# Patient Record
Sex: MALE | Race: WHITE | URBAN - METROPOLITAN AREA
[De-identification: names, ages, dates, MRNs, and addresses within clinical notes are randomized per-mention and may not be internally consistent; named-entity substitution may affect disease eponyms.]

---

## 2023-06-03 ENCOUNTER — HOSPITAL ENCOUNTER (INPATIENT)
Age: 73
LOS: 6 days | Discharge: HOME | DRG: 287 | End: 2023-06-09
Attending: STUDENT IN AN ORGANIZED HEALTH CARE EDUCATION/TRAINING PROGRAM | Admitting: FAMILY MEDICINE
Payer: MEDICARE

## 2023-06-03 DIAGNOSIS — Z98.890 STATUS POST DILATION OF ESOPHAGEAL NARROWING: ICD-10-CM

## 2023-06-03 DIAGNOSIS — Z87.19 STATUS POST DILATION OF ESOPHAGEAL NARROWING: ICD-10-CM

## 2023-06-03 PROBLEM — I34.0 SEVERE MITRAL REGURGITATION: Status: ACTIVE | Noted: 2023-06-03

## 2023-06-03 PROBLEM — I42.9 CARDIOMYOPATHY (HCC): Status: ACTIVE | Noted: 2023-06-03

## 2023-06-03 PROBLEM — F17.200 SMOKER: Status: ACTIVE | Noted: 2023-06-03

## 2023-06-03 PROBLEM — I07.1 MODERATE TRICUSPID REGURGITATION: Status: ACTIVE | Noted: 2023-06-03

## 2023-06-03 PROBLEM — I50.21 ACUTE SYSTOLIC HEART FAILURE (HCC): Status: ACTIVE | Noted: 2023-06-03

## 2023-06-03 LAB
ANION GAP SERPL CALCULATED.3IONS-SCNC: 13 MMOL/L (ref 9–17)
BUN SERPL-MCNC: 26 MG/DL (ref 8–23)
CALCIUM SERPL-MCNC: 9.1 MG/DL (ref 8.6–10.4)
CHLORIDE SERPL-SCNC: 101 MMOL/L (ref 98–107)
CO2 SERPL-SCNC: 21 MMOL/L (ref 20–31)
CREAT SERPL-MCNC: 0.94 MG/DL (ref 0.7–1.2)
ERYTHROCYTE [DISTWIDTH] IN BLOOD BY AUTOMATED COUNT: 12.9 % (ref 11.8–14.4)
GFR SERPL CREATININE-BSD FRML MDRD: >60 ML/MIN/1.73M2
GLUCOSE SERPL-MCNC: 127 MG/DL (ref 70–99)
HCT VFR BLD AUTO: 50.4 % (ref 40.7–50.3)
HGB BLD-MCNC: 15.6 G/DL (ref 13–17)
MCH RBC QN AUTO: 32.4 PG (ref 25.2–33.5)
MCHC RBC AUTO-ENTMCNC: 31 G/DL (ref 28.4–34.8)
MCV RBC AUTO: 104.6 FL (ref 82.6–102.9)
NRBC AUTOMATED: 0.2 PER 100 WBC
PARTIAL THROMBOPLASTIN TIME: 31.4 SEC (ref 23–36.5)
PLATELET # BLD AUTO: 144 K/UL (ref 138–453)
PMV BLD AUTO: 11.4 FL (ref 8.1–13.5)
POTASSIUM SERPL-SCNC: 4.8 MMOL/L (ref 3.7–5.3)
RBC # BLD AUTO: 4.82 M/UL (ref 4.21–5.77)
SODIUM SERPL-SCNC: 135 MMOL/L (ref 135–144)
T4 FREE SERPL-MCNC: 1.4 NG/DL (ref 0.9–1.7)
TSH SERPL-MCNC: 6.14 UIU/ML (ref 0.3–5)
WBC OTHER # BLD: 8.9 K/UL (ref 3.5–11.3)

## 2023-06-03 PROCEDURE — 85730 THROMBOPLASTIN TIME PARTIAL: CPT

## 2023-06-03 PROCEDURE — 84439 ASSAY OF FREE THYROXINE: CPT

## 2023-06-03 PROCEDURE — 6360000002 HC RX W HCPCS: Performed by: STUDENT IN AN ORGANIZED HEALTH CARE EDUCATION/TRAINING PROGRAM

## 2023-06-03 PROCEDURE — 2580000003 HC RX 258: Performed by: NURSE PRACTITIONER

## 2023-06-03 PROCEDURE — 36415 COLL VENOUS BLD VENIPUNCTURE: CPT

## 2023-06-03 PROCEDURE — 93005 ELECTROCARDIOGRAM TRACING: CPT | Performed by: NURSE PRACTITIONER

## 2023-06-03 PROCEDURE — 99223 1ST HOSP IP/OBS HIGH 75: CPT | Performed by: STUDENT IN AN ORGANIZED HEALTH CARE EDUCATION/TRAINING PROGRAM

## 2023-06-03 PROCEDURE — 85027 COMPLETE CBC AUTOMATED: CPT

## 2023-06-03 PROCEDURE — 6370000000 HC RX 637 (ALT 250 FOR IP): Performed by: STUDENT IN AN ORGANIZED HEALTH CARE EDUCATION/TRAINING PROGRAM

## 2023-06-03 PROCEDURE — 2580000003 HC RX 258: Performed by: STUDENT IN AN ORGANIZED HEALTH CARE EDUCATION/TRAINING PROGRAM

## 2023-06-03 PROCEDURE — 80048 BASIC METABOLIC PNL TOTAL CA: CPT

## 2023-06-03 PROCEDURE — 6370000000 HC RX 637 (ALT 250 FOR IP): Performed by: NURSE PRACTITIONER

## 2023-06-03 PROCEDURE — 84443 ASSAY THYROID STIM HORMONE: CPT

## 2023-06-03 PROCEDURE — 1200000000 HC SEMI PRIVATE

## 2023-06-03 RX ORDER — HEPARIN SODIUM 10000 [USP'U]/100ML
5-30 INJECTION, SOLUTION INTRAVENOUS CONTINUOUS
Status: DISCONTINUED | OUTPATIENT
Start: 2023-06-03 | End: 2023-06-09

## 2023-06-03 RX ORDER — ONDANSETRON 2 MG/ML
4 INJECTION INTRAMUSCULAR; INTRAVENOUS EVERY 6 HOURS PRN
Status: DISCONTINUED | OUTPATIENT
Start: 2023-06-03 | End: 2023-06-09 | Stop reason: HOSPADM

## 2023-06-03 RX ORDER — ASPIRIN 81 MG/1
81 TABLET, CHEWABLE ORAL DAILY
Status: DISCONTINUED | OUTPATIENT
Start: 2023-06-04 | End: 2023-06-09 | Stop reason: HOSPADM

## 2023-06-03 RX ORDER — POTASSIUM CHLORIDE 20 MEQ/1
40 TABLET, EXTENDED RELEASE ORAL PRN
Status: DISCONTINUED | OUTPATIENT
Start: 2023-06-03 | End: 2023-06-09 | Stop reason: HOSPADM

## 2023-06-03 RX ORDER — ACETAMINOPHEN 650 MG/1
650 SUPPOSITORY RECTAL EVERY 6 HOURS PRN
Status: DISCONTINUED | OUTPATIENT
Start: 2023-06-03 | End: 2023-06-09 | Stop reason: HOSPADM

## 2023-06-03 RX ORDER — ONDANSETRON 4 MG/1
4 TABLET, ORALLY DISINTEGRATING ORAL EVERY 8 HOURS PRN
Status: DISCONTINUED | OUTPATIENT
Start: 2023-06-03 | End: 2023-06-09 | Stop reason: HOSPADM

## 2023-06-03 RX ORDER — DEXAMETHASONE SODIUM PHOSPHATE 10 MG/ML
6 INJECTION INTRAMUSCULAR; INTRAVENOUS EVERY 24 HOURS
Status: DISCONTINUED | OUTPATIENT
Start: 2023-06-03 | End: 2023-06-06

## 2023-06-03 RX ORDER — PANTOPRAZOLE SODIUM 40 MG/1
40 TABLET, DELAYED RELEASE ORAL
Status: DISCONTINUED | OUTPATIENT
Start: 2023-06-04 | End: 2023-06-09 | Stop reason: HOSPADM

## 2023-06-03 RX ORDER — HEPARIN SODIUM 1000 [USP'U]/ML
2000 INJECTION, SOLUTION INTRAVENOUS; SUBCUTANEOUS PRN
Status: DISCONTINUED | OUTPATIENT
Start: 2023-06-03 | End: 2023-06-09 | Stop reason: HOSPADM

## 2023-06-03 RX ORDER — SODIUM CHLORIDE 0.9 % (FLUSH) 0.9 %
5-40 SYRINGE (ML) INJECTION EVERY 12 HOURS SCHEDULED
Status: DISCONTINUED | OUTPATIENT
Start: 2023-06-03 | End: 2023-06-09 | Stop reason: HOSPADM

## 2023-06-03 RX ORDER — HEPARIN SODIUM 1000 [USP'U]/ML
4000 INJECTION, SOLUTION INTRAVENOUS; SUBCUTANEOUS PRN
Status: DISCONTINUED | OUTPATIENT
Start: 2023-06-03 | End: 2023-06-09

## 2023-06-03 RX ORDER — POTASSIUM CHLORIDE 7.45 MG/ML
10 INJECTION INTRAVENOUS PRN
Status: DISCONTINUED | OUTPATIENT
Start: 2023-06-03 | End: 2023-06-09 | Stop reason: HOSPADM

## 2023-06-03 RX ORDER — SODIUM CHLORIDE 0.9 % (FLUSH) 0.9 %
10 SYRINGE (ML) INJECTION PRN
Status: DISCONTINUED | OUTPATIENT
Start: 2023-06-03 | End: 2023-06-09 | Stop reason: HOSPADM

## 2023-06-03 RX ORDER — MAGNESIUM SULFATE 1 G/100ML
1000 INJECTION INTRAVENOUS PRN
Status: DISCONTINUED | OUTPATIENT
Start: 2023-06-03 | End: 2023-06-09 | Stop reason: HOSPADM

## 2023-06-03 RX ORDER — ALBUTEROL SULFATE 2.5 MG/3ML
2.5 SOLUTION RESPIRATORY (INHALATION) EVERY 6 HOURS PRN
Status: DISCONTINUED | OUTPATIENT
Start: 2023-06-03 | End: 2023-06-09 | Stop reason: HOSPADM

## 2023-06-03 RX ORDER — SODIUM CHLORIDE 9 MG/ML
INJECTION, SOLUTION INTRAVENOUS PRN
Status: DISCONTINUED | OUTPATIENT
Start: 2023-06-03 | End: 2023-06-09 | Stop reason: HOSPADM

## 2023-06-03 RX ORDER — HEPARIN SODIUM 1000 [USP'U]/ML
4000 INJECTION, SOLUTION INTRAVENOUS; SUBCUTANEOUS ONCE
Status: COMPLETED | OUTPATIENT
Start: 2023-06-03 | End: 2023-06-03

## 2023-06-03 RX ORDER — ACETAMINOPHEN 325 MG/1
650 TABLET ORAL EVERY 6 HOURS PRN
Status: DISCONTINUED | OUTPATIENT
Start: 2023-06-03 | End: 2023-06-09 | Stop reason: HOSPADM

## 2023-06-03 RX ORDER — ATORVASTATIN CALCIUM 80 MG/1
80 TABLET, FILM COATED ORAL NIGHTLY
Status: DISCONTINUED | OUTPATIENT
Start: 2023-06-03 | End: 2023-06-09 | Stop reason: HOSPADM

## 2023-06-03 RX ADMIN — METOPROLOL TARTRATE 12.5 MG: 25 TABLET, FILM COATED ORAL at 20:57

## 2023-06-03 RX ADMIN — SODIUM CHLORIDE, PRESERVATIVE FREE 10 ML: 5 INJECTION INTRAVENOUS at 20:59

## 2023-06-03 RX ADMIN — ATORVASTATIN CALCIUM 80 MG: 80 TABLET, FILM COATED ORAL at 20:57

## 2023-06-03 RX ADMIN — HEPARIN SODIUM 12 UNITS/KG/HR: 10000 INJECTION, SOLUTION INTRAVENOUS at 21:24

## 2023-06-03 RX ADMIN — DEXAMETHASONE SODIUM PHOSPHATE 6 MG: 10 INJECTION INTRAMUSCULAR; INTRAVENOUS at 20:55

## 2023-06-03 RX ADMIN — HEPARIN SODIUM 4000 UNITS: 1000 INJECTION INTRAVENOUS; SUBCUTANEOUS at 21:25

## 2023-06-03 RX ADMIN — AMIODARONE HYDROCHLORIDE 1 MG/MIN: 50 INJECTION, SOLUTION INTRAVENOUS at 21:01

## 2023-06-03 ASSESSMENT — ENCOUNTER SYMPTOMS
ABDOMINAL DISTENTION: 0
WHEEZING: 1
BACK PAIN: 0
CHEST TIGHTNESS: 1
ABDOMINAL PAIN: 0
COLOR CHANGE: 0

## 2023-06-03 NOTE — H&P
[facility]  Office: [address], DO, Naga Mares, DO, Erum Valdez, DO, Haider Christie Blood, DO, Daksha Mccarthy MD, Bertin Jasso MD, Perla Pacheco MD, Cory Moses MD,  Pooja Pichardo MD, Dewey Sharpe MD, Delcia Epley, DO, Salvatore Camp MD,  Mk Mancia MD, Maximiliano Kidd MD, Smiley Chávez, DO, Ursula Del Angel MD, Dottie Enamorado MD, Darya Levy, DO, Christopher Evans MD, Pat Wetzel MD, Carlotta Krause MD, Tereso Owens MD,  Rosette Fitch DO, Neyda Griffin MD,  Francesca Pathak, CNP,  Elaine Donaldson, CNP, Henok Myers, CNP, Yue Canas, CNP,  Angelita Rangel, [facility], Marquez Flores, CNP, Herman Schwartz, CNP, Loreto Dewey, CNP, Bill Velásquez, CNP, Bekah Trevzio, CNP, Bobbi Rob PA-C, Mode Frausto, [facility], Jack Zheng, CNP, An Ervin, [facility]         [place]    HISTORY AND PHYSICAL EXAMINATION            Date:   6/3/2023  Patient name:  Steven Justin  Date of admission:  6/3/2023  6:08 PM  MRN:   [id number]  Account:  [de-identified]  YOB: 1950  PCP:    Unknown Provider Result (Inactive)  Room:   2004/2004-01  Code Status:    Full Code    Chief Complaint:     Shortness of breath    History Obtained From:     patient, spouse, electronic medical record    History of Present Illness:     Steven Justin is a 68 y.o. Non- / non  male who presents with No chief complaint on file. and is admitted to the hospital for the management of Cardiomyopathy [facility]). 59-year-old male past medical history of orthostatic hypotension, hyperlipidemia presents to [facility] facility with shortness of breath found to have new onset A-fib patient was originally started on amiodarone drip underwent echo showing LVEF 15 to 20% with global hypokinesis of the left ventricle and moderate tricuspid regurgitation moderate to severe mitral regurgitation.  Pateint was transferred to [facility]

## 2023-06-04 ENCOUNTER — APPOINTMENT (OUTPATIENT)
Dept: CT IMAGING | Age: 73
DRG: 287 | End: 2023-06-04
Attending: STUDENT IN AN ORGANIZED HEALTH CARE EDUCATION/TRAINING PROGRAM
Payer: MEDICARE

## 2023-06-04 PROBLEM — E03.8 SUBCLINICAL HYPOTHYROIDISM: Status: ACTIVE | Noted: 2023-06-04

## 2023-06-04 PROBLEM — I48.91 NEW ONSET ATRIAL FIBRILLATION (HCC): Status: ACTIVE | Noted: 2023-06-04

## 2023-06-04 LAB
CHOLEST SERPL-MCNC: 144 MG/DL
CHOLESTEROL/HDL RATIO: 3.2
ERYTHROCYTE [DISTWIDTH] IN BLOOD BY AUTOMATED COUNT: 13 % (ref 11.8–14.4)
HCT VFR BLD AUTO: 47.5 % (ref 40.7–50.3)
HDLC SERPL-MCNC: 45 MG/DL
HGB BLD-MCNC: 15.3 G/DL (ref 13–17)
LDLC SERPL CALC-MCNC: 89 MG/DL (ref 0–130)
MAGNESIUM SERPL-MCNC: 2.2 MG/DL (ref 1.6–2.6)
MCH RBC QN AUTO: 32.1 PG (ref 25.2–33.5)
MCHC RBC AUTO-ENTMCNC: 32.2 G/DL (ref 28.4–34.8)
MCV RBC AUTO: 99.6 FL (ref 82.6–102.9)
NRBC AUTOMATED: 0 PER 100 WBC
PARTIAL THROMBOPLASTIN TIME: 57.3 SEC (ref 23–36.5)
PARTIAL THROMBOPLASTIN TIME: 64.5 SEC (ref 23–36.5)
PARTIAL THROMBOPLASTIN TIME: 79.5 SEC (ref 23–36.5)
PLATELET # BLD AUTO: 156 K/UL (ref 138–453)
PMV BLD AUTO: 10.9 FL (ref 8.1–13.5)
RBC # BLD AUTO: 4.77 M/UL (ref 4.21–5.77)
TRIGL SERPL-MCNC: 50 MG/DL
TROPONIN I SERPL HS-MCNC: 24 NG/L (ref 0–22)
TROPONIN I SERPL HS-MCNC: 29 NG/L (ref 0–22)
WBC OTHER # BLD: 8.2 K/UL (ref 3.5–11.3)

## 2023-06-04 PROCEDURE — 2580000003 HC RX 258: Performed by: STUDENT IN AN ORGANIZED HEALTH CARE EDUCATION/TRAINING PROGRAM

## 2023-06-04 PROCEDURE — 71250 CT THORAX DX C-: CPT

## 2023-06-04 PROCEDURE — 85027 COMPLETE CBC AUTOMATED: CPT

## 2023-06-04 PROCEDURE — 6370000000 HC RX 637 (ALT 250 FOR IP): Performed by: STUDENT IN AN ORGANIZED HEALTH CARE EDUCATION/TRAINING PROGRAM

## 2023-06-04 PROCEDURE — 99254 IP/OBS CNSLTJ NEW/EST MOD 60: CPT | Performed by: INTERNAL MEDICINE

## 2023-06-04 PROCEDURE — 2700000000 HC OXYGEN THERAPY PER DAY

## 2023-06-04 PROCEDURE — 99232 SBSQ HOSP IP/OBS MODERATE 35: CPT | Performed by: STUDENT IN AN ORGANIZED HEALTH CARE EDUCATION/TRAINING PROGRAM

## 2023-06-04 PROCEDURE — 84484 ASSAY OF TROPONIN QUANT: CPT

## 2023-06-04 PROCEDURE — 6360000002 HC RX W HCPCS: Performed by: STUDENT IN AN ORGANIZED HEALTH CARE EDUCATION/TRAINING PROGRAM

## 2023-06-04 PROCEDURE — 6370000000 HC RX 637 (ALT 250 FOR IP): Performed by: NURSE PRACTITIONER

## 2023-06-04 PROCEDURE — 85730 THROMBOPLASTIN TIME PARTIAL: CPT

## 2023-06-04 PROCEDURE — 1200000000 HC SEMI PRIVATE

## 2023-06-04 PROCEDURE — 83735 ASSAY OF MAGNESIUM: CPT

## 2023-06-04 PROCEDURE — 99223 1ST HOSP IP/OBS HIGH 75: CPT | Performed by: INTERNAL MEDICINE

## 2023-06-04 PROCEDURE — 36415 COLL VENOUS BLD VENIPUNCTURE: CPT

## 2023-06-04 PROCEDURE — 2580000003 HC RX 258: Performed by: NURSE PRACTITIONER

## 2023-06-04 PROCEDURE — 80061 LIPID PANEL: CPT

## 2023-06-04 RX ORDER — SPIRONOLACTONE 25 MG/1
25 TABLET ORAL DAILY
Status: DISCONTINUED | OUTPATIENT
Start: 2023-06-04 | End: 2023-06-09 | Stop reason: HOSPADM

## 2023-06-04 RX ADMIN — ATORVASTATIN CALCIUM 80 MG: 80 TABLET, FILM COATED ORAL at 20:07

## 2023-06-04 RX ADMIN — ASPIRIN 81 MG 81 MG: 81 TABLET ORAL at 08:55

## 2023-06-04 RX ADMIN — HEPARIN SODIUM 10 UNITS/KG/HR: 10000 INJECTION, SOLUTION INTRAVENOUS at 18:46

## 2023-06-04 RX ADMIN — SODIUM CHLORIDE, PRESERVATIVE FREE 10 ML: 5 INJECTION INTRAVENOUS at 20:08

## 2023-06-04 RX ADMIN — SACUBITRIL AND VALSARTAN 1 TABLET: 24; 26 TABLET, FILM COATED ORAL at 10:35

## 2023-06-04 RX ADMIN — METOPROLOL TARTRATE 12.5 MG: 25 TABLET, FILM COATED ORAL at 08:54

## 2023-06-04 RX ADMIN — SODIUM CHLORIDE, PRESERVATIVE FREE 10 ML: 5 INJECTION INTRAVENOUS at 08:55

## 2023-06-04 RX ADMIN — SPIRONOLACTONE 25 MG: 25 TABLET, FILM COATED ORAL at 08:54

## 2023-06-04 RX ADMIN — DEXAMETHASONE SODIUM PHOSPHATE 6 MG: 10 INJECTION INTRAMUSCULAR; INTRAVENOUS at 20:07

## 2023-06-04 RX ADMIN — AMIODARONE HYDROCHLORIDE 0.5 MG/MIN: 50 INJECTION, SOLUTION INTRAVENOUS at 06:12

## 2023-06-04 RX ADMIN — AMIODARONE HYDROCHLORIDE 0.5 MG/MIN: 50 INJECTION, SOLUTION INTRAVENOUS at 18:43

## 2023-06-04 RX ADMIN — PANTOPRAZOLE SODIUM 40 MG: 40 TABLET, DELAYED RELEASE ORAL at 06:12

## 2023-06-04 RX ADMIN — SACUBITRIL AND VALSARTAN 1 TABLET: 24; 26 TABLET, FILM COATED ORAL at 20:07

## 2023-06-04 RX ADMIN — METOPROLOL TARTRATE 12.5 MG: 25 TABLET, FILM COATED ORAL at 20:07

## 2023-06-04 NOTE — CONSULTS
[facility]    Consult / H&P               Today's Date: 6/4/2023  Patient Name: Tesha Cartagena  Date of admission: 6/3/2023  6:08 PM  Patient's age: 68 y. o., 1950  Admission Dx: Cardiomyopathy [facility]) [I42.9]    Reason for Consult:  Cardiac evaluation    Requesting Physician: No admitting provider for patient encounter. CHIEF COMPLAINT:  Dyspnea    History Obtained From:  patient, electronic medical record    HISTORY OF PRESENT ILLNESS:      The patient is a 68 y.o. male [facility] exertion found to have new onset A-fib with RVR. As per EMR, patient was initially admitted to outside facility and started on amiodarone drip for A-fib with RVR, he did undergo an echocardiogram showing LVEF 15 to 20% with global hypokinesis with moderate TR and moderate to severe MR. He was transferred to [facility] for further work-up and converted to NSR on his way to the hospital. No previous cardiac ischemic work-up on file. Past Medical History:   has no past medical history on file. Past Surgical History:   has no past surgical history on file.      Home Medications:    Prior to Admission medications    Not on File      Current Facility-Administered Medications: sodium chloride flush 0.9 % injection 5-40 mL, 5-40 mL, IntraVENous, 2 times per day  sodium chloride flush 0.9 % injection 10 mL, 10 mL, IntraVENous, PRN  0.9 % sodium chloride infusion, , IntraVENous, PRN  potassium chloride (KLOR-CON M) extended release tablet 40 mEq, 40 mEq, Oral, PRN **OR** potassium bicarb-citric acid (EFFER-K) effervescent tablet 40 mEq, 40 mEq, Oral, PRN **OR** potassium chloride 10 mEq/100 mL IVPB (Peripheral Line), 10 mEq, IntraVENous, PRN  magnesium sulfate 1000 mg in dextrose 5% 100 mL IVPB, 1,000 mg, IntraVENous, PRN  ondansetron (ZOFRAN-ODT) disintegrating tablet 4 mg, 4 mg, Oral, Q8H PRN **OR** ondansetron (ZOFRAN) injection 4 mg, 4 mg, IntraVENous, Q6H

## 2023-06-04 NOTE — PROGRESS NOTES
[facility]  Office: [address], DO, Jacek Yee, DO, Zeke Kail, DO, Lili Jean Blood, DO, Toni Gilbert MD, Ina Crenshaw MD, Makenna Moya MD, Ras Mishra MD,  Felton Hayes MD, Ira Roque MD, Randi Fernandez, DO, Odalis Callahan MD,  Sylwia Burroughs, DO, Adarsh Davis MD, Blayne Blum MD, Virgilio Larson, DO, Telly Keller MD, Victorina Mojica MD, Sandro Manley, DO, Bernice Bustos MD, Narda Marin MD, Geneva Garsia MD, Constantin Santacruz MD,  Tiara Lane, DO, Justyn Londono MD,  Radha Lucia, CNP,  Curry Robin, CNP, Albaro Azevedo, CNP, Zay Talley, CNP,  Sofy Jose, [facility], Arya Escobar, CNP, Nimisha Vargas, CNP, Ezequiel Grigsby, CNP, Carol Churchill, CNP, Marie Corley, CNP, Abel Ruffin PAAntonioC, Sher Diez, CNS, Makenna Valentine, CNP, Zack Meraz, [address]    Progress Note    6/4/2023    9:14 AM    Name:   Terry Billingsley  MRN:     [id number]     Acct:      [de-identified]   Room:   2004/2004-01   Day:  1  Admit Date:  6/3/2023  6:08 PM    PCP:   Unknown Provider Result (Inactive)  Code Status:  Full Code    Subjective:     C/C: Dyspnea. Interval History Status: improved. Vitals reviewed, afebrile and hemodynamically stable. Saturating well on 2 L nasal cannula. Labs reviewed, potassium 4.8 magnesium 2.2. Hemoglobin and platelets are stable on heparin infusion. Echocardiogram reviewed EF 15 to 20% with global hypokinesia. Overnight patient had no significant events. On examination patient resting comfortably in chair at bedside. No complaints at this time. Current in NSR. States he feels better from a respiratory standpoint. Plan for JEAN PIERRE and Cath tomorrow.      Brief History:     79-year-old male past medical history of orthostatic hypotension, hyperlipidemia presents to outlying facility with shortness of breath found to have new onset A-fib patient was originally

## 2023-06-04 NOTE — CARE COORDINATION
Case Management Assessment  Initial Evaluation    Date/Time of Evaluation: 6/4/2023 8:26 AM  Assessment Completed by: Opal Paul RN    If patient is discharged prior to next notation, then this note serves as note for discharge by case management. Patient Name: Yolis Santoyo                   YOB: 1950  Diagnosis: Cardiomyopathy [facility]) [I42.9]                   Date / Time: 6/3/2023  6:08 PM    Patient Admission Status: Inpatient   Readmission Risk (Low < 19, Mod (19-27), High > 27): Readmission Risk Score: 5.9    Current PCP: Unknown Provider Result (Inactive)  PCP verified by CM? (P) Yes Jose Luis Hardwick in [place])    Chart Reviewed: Yes      History Provided by: Patient  Patient Orientation: Alert and Oriented    Patient Cognition: Alert    Hospitalization in the last 30 days (Readmission):  No    If yes, Readmission Assessment in CM Navigator will be completed.     Advance Directives:      Code Status: Full Code   Patient's Primary Decision Maker is: (P) Legal Next of Kin (wife relates she is, she forgot to bring paperwork)      Discharge Planning:    Patient lives with: (P) Spouse/Significant Other Type of Home: (P) House  Primary Care Giver: Self  Patient Support Systems include: Spouse/Significant Other, Children, Family Members   Current Financial resources: (P) Medicare (faxed copy of insurance cards to registration [id number])  Current community resources: (P) None  Current services prior to admission: (P) None            Current DME:              Type of Home Care services:  (P) None    ADLS  Prior functional level: (P) Independent in ADLs/IADLs  Current functional level: (P) Independent in ADLs/IADLs    PT AM-PAC:   /24  OT AM-PAC:   /24    Family can provide assistance at DC: (P) Yes  Would you like Case Management to discuss the discharge plan with any other family members/significant others, and if so, who? (P) Yes (wife)  Plans to Return to Present Housing: (P) Yes  Other Identified

## 2023-06-05 LAB
EKG ATRIAL RATE: 62 BPM
EKG P AXIS: 52 DEGREES
EKG P-R INTERVAL: 152 MS
EKG Q-T INTERVAL: 428 MS
EKG QRS DURATION: 114 MS
EKG QTC CALCULATION (BAZETT): 434 MS
EKG R AXIS: -38 DEGREES
EKG T AXIS: 31 DEGREES
EKG VENTRICULAR RATE: 62 BPM
ERYTHROCYTE [DISTWIDTH] IN BLOOD BY AUTOMATED COUNT: 12.8 % (ref 11.8–14.4)
HCT VFR BLD AUTO: 43.5 % (ref 40.7–50.3)
HGB BLD-MCNC: 14.4 G/DL (ref 13–17)
MCH RBC QN AUTO: 32.6 PG (ref 25.2–33.5)
MCHC RBC AUTO-ENTMCNC: 33.1 G/DL (ref 28.4–34.8)
MCV RBC AUTO: 98.4 FL (ref 82.6–102.9)
NRBC AUTOMATED: 0 PER 100 WBC
PARTIAL THROMBOPLASTIN TIME: 150.3 SEC (ref 23–36.5)
PARTIAL THROMBOPLASTIN TIME: 32.6 SEC (ref 23–36.5)
PARTIAL THROMBOPLASTIN TIME: 87.3 SEC (ref 23–36.5)
PLATELET # BLD AUTO: NORMAL K/UL (ref 138–453)
PLATELET, FLUORESCENCE: 141 K/UL (ref 138–453)
PLATELETS.RETICULATED NFR BLD AUTO: 6.1 % (ref 1.1–10.3)
RBC # BLD AUTO: 4.42 M/UL (ref 4.21–5.77)
WBC OTHER # BLD: 10.8 K/UL (ref 3.5–11.3)

## 2023-06-05 PROCEDURE — 2709999900 HC NON-CHARGEABLE SUPPLY

## 2023-06-05 PROCEDURE — 85027 COMPLETE CBC AUTOMATED: CPT

## 2023-06-05 PROCEDURE — 6360000002 HC RX W HCPCS: Performed by: STUDENT IN AN ORGANIZED HEALTH CARE EDUCATION/TRAINING PROGRAM

## 2023-06-05 PROCEDURE — 6370000000 HC RX 637 (ALT 250 FOR IP): Performed by: STUDENT IN AN ORGANIZED HEALTH CARE EDUCATION/TRAINING PROGRAM

## 2023-06-05 PROCEDURE — 85055 RETICULATED PLATELET ASSAY: CPT

## 2023-06-05 PROCEDURE — 94761 N-INVAS EAR/PLS OXIMETRY MLT: CPT

## 2023-06-05 PROCEDURE — 85730 THROMBOPLASTIN TIME PARTIAL: CPT

## 2023-06-05 PROCEDURE — 6370000000 HC RX 637 (ALT 250 FOR IP): Performed by: NURSE PRACTITIONER

## 2023-06-05 PROCEDURE — 36415 COLL VENOUS BLD VENIPUNCTURE: CPT

## 2023-06-05 PROCEDURE — 99232 SBSQ HOSP IP/OBS MODERATE 35: CPT | Performed by: STUDENT IN AN ORGANIZED HEALTH CARE EDUCATION/TRAINING PROGRAM

## 2023-06-05 PROCEDURE — 2500000003 HC RX 250 WO HCPCS

## 2023-06-05 PROCEDURE — 6360000004 HC RX CONTRAST MEDICATION

## 2023-06-05 PROCEDURE — 2700000000 HC OXYGEN THERAPY PER DAY

## 2023-06-05 PROCEDURE — 2580000003 HC RX 258: Performed by: NURSE PRACTITIONER

## 2023-06-05 PROCEDURE — 1200000000 HC SEMI PRIVATE

## 2023-06-05 PROCEDURE — 6360000002 HC RX W HCPCS

## 2023-06-05 PROCEDURE — 93010 ELECTROCARDIOGRAM REPORT: CPT | Performed by: INTERNAL MEDICINE

## 2023-06-05 PROCEDURE — 2580000003 HC RX 258: Performed by: STUDENT IN AN ORGANIZED HEALTH CARE EDUCATION/TRAINING PROGRAM

## 2023-06-05 RX ADMIN — HEPARIN SODIUM 3 UNITS/KG/HR: 10000 INJECTION, SOLUTION INTRAVENOUS at 13:49

## 2023-06-05 RX ADMIN — HEPARIN SODIUM 6 UNITS/KG/HR: 10000 INJECTION, SOLUTION INTRAVENOUS at 07:32

## 2023-06-05 RX ADMIN — ATORVASTATIN CALCIUM 80 MG: 80 TABLET, FILM COATED ORAL at 20:16

## 2023-06-05 RX ADMIN — SODIUM CHLORIDE, PRESERVATIVE FREE 10 ML: 5 INJECTION INTRAVENOUS at 20:15

## 2023-06-05 RX ADMIN — SODIUM CHLORIDE, PRESERVATIVE FREE 10 ML: 5 INJECTION INTRAVENOUS at 08:23

## 2023-06-05 RX ADMIN — DEXAMETHASONE SODIUM PHOSPHATE 6 MG: 10 INJECTION INTRAMUSCULAR; INTRAVENOUS at 20:16

## 2023-06-05 RX ADMIN — SACUBITRIL AND VALSARTAN 1 TABLET: 24; 26 TABLET, FILM COATED ORAL at 20:16

## 2023-06-05 RX ADMIN — PANTOPRAZOLE SODIUM 40 MG: 40 TABLET, DELAYED RELEASE ORAL at 08:23

## 2023-06-05 RX ADMIN — ASPIRIN 81 MG 81 MG: 81 TABLET ORAL at 08:23

## 2023-06-05 RX ADMIN — SPIRONOLACTONE 25 MG: 25 TABLET, FILM COATED ORAL at 08:23

## 2023-06-05 RX ADMIN — METOPROLOL TARTRATE 12.5 MG: 25 TABLET, FILM COATED ORAL at 20:16

## 2023-06-05 RX ADMIN — METOPROLOL TARTRATE 12.5 MG: 25 TABLET, FILM COATED ORAL at 08:23

## 2023-06-05 RX ADMIN — SACUBITRIL AND VALSARTAN 1 TABLET: 24; 26 TABLET, FILM COATED ORAL at 08:24

## 2023-06-05 RX ADMIN — AMIODARONE HYDROCHLORIDE 0.5 MG/MIN: 50 INJECTION, SOLUTION INTRAVENOUS at 10:16

## 2023-06-05 NOTE — PROGRESS NOTES
[facility]  Office: [address], DO, Zan Nelson, DO, Kilo Adams, DO, Russ Gutierrez Blood, DO, Velton Habermann, MD, Emilie Aden MD, Ada Da Silva MD, Chris Oden MD,  Paige Metcalf MD, Tammi Mckeon MD, Bethanie Killian, DO, Adonay Flores MD,  Maria Mike MD, Mónica Key MD, Demi Hathaway, DO, Asia Garvey MD, Ruth Kidd MD, Kosta Mccrary DO, Ashlie Patel MD, Mag Lundberg MD, Don Mcelroy MD, Billy Yi MD,  Davey Arauz, DO, Yoselin Duong MD,  Yovani Yu, CNP,  Daiana Ramos, CNP, Mallorie Boone, CNP, Aishwarya Hawley, CNP,  Neri Mejia, [facility], Nicolasa Goel, CNP, Jacob Fam, CNP, Mike Thornton, CNP, Merlyn Gallo, CNP, Caitlin Simmons, CNP, Hugh Phlegm, PA-C, Angella Matos, CNS, Toni Drew, CNP, Georges Duenas, CNP         [address]    Progress Note    6/5/2023    7:30 AM    Name:   Thalia Fitzpatrick  MRN:     [id number]     Acct:      [de-identified]   Room:   2004/2004-01   Day:  2  Admit Date:  6/3/2023  6:08 PM    PCP:   Unknown Provider Result (Inactive)  Code Status:  Full Code    Subjective:     C/C: Dyspnea. Interval History Status: improved. Vitals reviewed, afebrile and hemodynamically stable. Saturating well on room air. Labs reviewed, Hemoglobin and platelets are stable on heparin infusion. Echocardiogram reviewed EF 15 to 20% with global hypokinesia. Overnight patient had no significant events. On examination patient resting comfortably in chair at bedside. Plan for JEAN PIERRE and Cath today.      Brief History:     77-year-old male past medical history of orthostatic hypotension, [facility] found to have new onset A-fib patient was originally started on amiodarone drip underwent echo showing LVEF 15 to 20% with global hypokinesis of the left ventricle and moderate tricuspid

## 2023-06-05 NOTE — PLAN OF CARE
During JEAN PIERRE, patient unable to tolerate several attempts of intubation.  Will have to re-schedule JEAN PIERRE/RHC/LHC with anesthesia, possibly tomorrow

## 2023-06-05 NOTE — PROGRESS NOTES
Congestive Heart Failure Education completed and charted. CHF booklet given. Family/caregiver was receptive to education. Discussed the  importance of medication compliance. Discussed the importance of a heart healthy diet. Discussed 2000 mg sodium-restricted daily diet. Patient instructed to limit fluid intake to  1.5 to 2 liters per day. Patient instructed to weigh self at the same time of each day each morning, reinforced teaching to monitor for 3-5 lb weight increase over 1-2 days notify physician if change noted. Signs and symptoms of CHF discussed with patient, such as feeling more tired than normal, feeling short of breath, coughing that increases when lying down, sudden weight gain, swelling of the feet, legs or belly. Patient verbalized understanding to notify physician office if these symptoms occur.   EF 15-20%  Discussed referral with wife to [address] and elects to wait for referral. Will see Cardiology in the office in [place], [place] first.

## 2023-06-06 ENCOUNTER — APPOINTMENT (OUTPATIENT)
Dept: CARDIAC CATH/INVASIVE PROCEDURES | Age: 73
DRG: 287 | End: 2023-06-06
Attending: STUDENT IN AN ORGANIZED HEALTH CARE EDUCATION/TRAINING PROGRAM
Payer: MEDICARE

## 2023-06-06 ENCOUNTER — ANESTHESIA EVENT (OUTPATIENT)
Dept: CARDIAC CATH/INVASIVE PROCEDURES | Age: 73
End: 2023-06-06
Payer: MEDICARE

## 2023-06-06 ENCOUNTER — ANESTHESIA (OUTPATIENT)
Dept: CARDIAC CATH/INVASIVE PROCEDURES | Age: 73
End: 2023-06-06
Payer: MEDICARE

## 2023-06-06 LAB
PARTIAL THROMBOPLASTIN TIME: 30.7 SEC (ref 23–36.5)
PARTIAL THROMBOPLASTIN TIME: 35.1 SEC (ref 23–36.5)
PARTIAL THROMBOPLASTIN TIME: 52.2 SEC (ref 23–36.5)
PARTIAL THROMBOPLASTIN TIME: 53.3 SEC (ref 23–36.5)

## 2023-06-06 PROCEDURE — 6370000000 HC RX 637 (ALT 250 FOR IP): Performed by: STUDENT IN AN ORGANIZED HEALTH CARE EDUCATION/TRAINING PROGRAM

## 2023-06-06 PROCEDURE — 6360000002 HC RX W HCPCS: Performed by: SURGERY

## 2023-06-06 PROCEDURE — 2580000003 HC RX 258: Performed by: NURSE PRACTITIONER

## 2023-06-06 PROCEDURE — 36415 COLL VENOUS BLD VENIPUNCTURE: CPT

## 2023-06-06 PROCEDURE — 6360000002 HC RX W HCPCS: Performed by: STUDENT IN AN ORGANIZED HEALTH CARE EDUCATION/TRAINING PROGRAM

## 2023-06-06 PROCEDURE — 99233 SBSQ HOSP IP/OBS HIGH 50: CPT | Performed by: FAMILY MEDICINE

## 2023-06-06 PROCEDURE — 85730 THROMBOPLASTIN TIME PARTIAL: CPT

## 2023-06-06 PROCEDURE — 6370000000 HC RX 637 (ALT 250 FOR IP): Performed by: NURSE PRACTITIONER

## 2023-06-06 PROCEDURE — 99232 SBSQ HOSP IP/OBS MODERATE 35: CPT | Performed by: SURGERY

## 2023-06-06 PROCEDURE — 2580000003 HC RX 258: Performed by: STUDENT IN AN ORGANIZED HEALTH CARE EDUCATION/TRAINING PROGRAM

## 2023-06-06 PROCEDURE — 1200000000 HC SEMI PRIVATE

## 2023-06-06 RX ORDER — DIGOXIN 0.25 MG/ML
250 INJECTION INTRAMUSCULAR; INTRAVENOUS EVERY 4 HOURS
Status: COMPLETED | OUTPATIENT
Start: 2023-06-06 | End: 2023-06-06

## 2023-06-06 RX ORDER — METOPROLOL TARTRATE 5 MG/5ML
5 INJECTION INTRAVENOUS EVERY 6 HOURS PRN
Status: DISCONTINUED | OUTPATIENT
Start: 2023-06-06 | End: 2023-06-08

## 2023-06-06 RX ORDER — PREDNISONE 20 MG/1
40 TABLET ORAL DAILY
Status: COMPLETED | OUTPATIENT
Start: 2023-06-07 | End: 2023-06-09

## 2023-06-06 RX ORDER — METOPROLOL TARTRATE 50 MG/1
50 TABLET, FILM COATED ORAL 2 TIMES DAILY
Status: DISCONTINUED | OUTPATIENT
Start: 2023-06-06 | End: 2023-06-09 | Stop reason: HOSPADM

## 2023-06-06 RX ADMIN — SACUBITRIL AND VALSARTAN 1 TABLET: 24; 26 TABLET, FILM COATED ORAL at 20:10

## 2023-06-06 RX ADMIN — DIGOXIN 250 MCG: 0.25 INJECTION INTRAMUSCULAR; INTRAVENOUS at 15:56

## 2023-06-06 RX ADMIN — SODIUM CHLORIDE, PRESERVATIVE FREE 10 ML: 5 INJECTION INTRAVENOUS at 07:45

## 2023-06-06 RX ADMIN — HEPARIN SODIUM 7 UNITS/KG/HR: 10000 INJECTION, SOLUTION INTRAVENOUS at 18:39

## 2023-06-06 RX ADMIN — PANTOPRAZOLE SODIUM 40 MG: 40 TABLET, DELAYED RELEASE ORAL at 07:44

## 2023-06-06 RX ADMIN — SPIRONOLACTONE 25 MG: 25 TABLET, FILM COATED ORAL at 07:44

## 2023-06-06 RX ADMIN — ASPIRIN 81 MG 81 MG: 81 TABLET ORAL at 07:44

## 2023-06-06 RX ADMIN — SACUBITRIL AND VALSARTAN 1 TABLET: 24; 26 TABLET, FILM COATED ORAL at 07:47

## 2023-06-06 RX ADMIN — METOPROLOL TARTRATE 12.5 MG: 25 TABLET, FILM COATED ORAL at 07:44

## 2023-06-06 RX ADMIN — ATORVASTATIN CALCIUM 80 MG: 80 TABLET, FILM COATED ORAL at 20:10

## 2023-06-06 RX ADMIN — DIGOXIN 250 MCG: 0.25 INJECTION INTRAMUSCULAR; INTRAVENOUS at 20:10

## 2023-06-06 RX ADMIN — AMIODARONE HYDROCHLORIDE 0.5 MG/MIN: 50 INJECTION, SOLUTION INTRAVENOUS at 18:38

## 2023-06-06 RX ADMIN — AMIODARONE HYDROCHLORIDE 0.5 MG/MIN: 50 INJECTION, SOLUTION INTRAVENOUS at 03:36

## 2023-06-06 ASSESSMENT — ENCOUNTER SYMPTOMS
VOMITING: 0
BLOOD IN STOOL: 0
CONSTIPATION: 0
COUGH: 0
NAUSEA: 0
WHEEZING: 0
SHORTNESS OF BREATH: 1
CHEST TIGHTNESS: 0
DIARRHEA: 0
ABDOMINAL PAIN: 0

## 2023-06-06 NOTE — CARE COORDINATION
Met with patient and wife to discuss transitional planning. Patient is returning home with his wife.  They deny needs

## 2023-06-06 NOTE — ANESTHESIA PRE PROCEDURE
Spencer Kail, APRN - NP        acetaminophen (TYLENOL) tablet 650 mg  650 mg Oral Q6H PRN Spencer Kail, APRN - NP        Or    acetaminophen (TYLENOL) suppository 650 mg  650 mg Rectal Q6H PRN Spencer Kail, APRN - NP        magnesium hydroxide (MILK OF MAGNESIA) 400 MG/5ML suspension 30 mL  30 mL Oral Daily PRN Spencer Kail, APRN - NP        atorvastatin (LIPITOR) tablet 80 mg  80 mg Oral Nightly Spencer Kail, APRN - NP   80 mg at 06/06/23 2010    aspirin chewable tablet 81 mg  81 mg Oral Daily Spencer Kail, APRN - NP   81 mg at 06/06/23 0744    amiodarone (CORDARONE) 450 mg in dextrose 5 % 250 mL infusion  0.5 mg/min IntraVENous Continuous Cristopher Patricio MD 16.7 mL/hr at 06/06/23 1838 0.5 mg/min at 06/06/23 1838    albuterol (PROVENTIL) (2.5 MG/3ML) 0.083% nebulizer solution 2.5 mg  2.5 mg Nebulization Q6H PRN Cristopher Patricio MD        pantoprazole (PROTONIX) tablet 40 mg  40 mg Oral QAM AC Cristopher Patricio MD   40 mg at 06/06/23 0744    heparin (porcine) injection 4,000 Units  4,000 Units IntraVENous PRN Cristopher Patricio MD        heparin (porcine) injection 2,000 Units  2,000 Units IntraVENous PRN Cristopher Patricio MD        heparin 25,000 units in dextrose 5% 250 mL (premix) infusion  5-30 Units/kg/hr IntraVENous Continuous Cristopher Patricio MD 5.6 mL/hr at 06/06/23 1839 7 Units/kg/hr at 06/06/23 1839       Allergies:  No Known Allergies    Problem List:    Patient Active Problem List   Diagnosis Code    Cardiomyopathy ([facility] L88.8    Acute systolic heart failure (HCC) I50.21    Severe mitral regurgitation I34.0    Moderate tricuspid regurgitation I07.1    Smoker F17.200    Subclinical hypothyroidism E03.8    New onset atrial fibrillation ([facility] I48.91       Past Medical History:  History reviewed. No pertinent past medical history. Past Surgical History:  History reviewed. No pertinent surgical history.     Social History:    Social History     Tobacco Use   

## 2023-06-06 NOTE — PROGRESS NOTES
[facility]  Office: [address], DO, Steph Espinal, DO, Vamsi Stevenson, DO, Chaparrita Veliz, DO, Erin Bermudez MD, Linda Morfin MD, Lion Roa MD, Christel Mayo MD,  Billie Soriano MD, Norm Hein MD, Ebonie Alberto, DO, Jenn Suarez MD,  Maame Ansari MD, Viviana Hernandez MD, Nadine Bruce, DO, Gustavus Najjar, MD, Azul Moralez MD, Misael Garcia, DO, Francheska Metz MD, Gerald Kanner, MD, Antolin Pastrana MD, Turner Gonsales MD,  Cookie Brittle, DO, Lindy Porras MD,  Melissa Mendez, CNP,  Leda Calix CNP, Cherri Osorio, CNP, Priti Kraus, CNP,  Toño Valenzuela, [facility], Ilia Caban, CNP, Chris Castro, CNP, Hansel Angelucci, CNP, Alba Fuentes, CNP, Minal Ferguson, CNP, Lisa Villatoro PA-C, Tyson Lopez, CNS, Juanita Kramer, CNP, Aida Cheng, [address]    Progress Note    6/6/2023    5:02 PM    Name:   Tawanda Guidry  MRN:     [id number]     Acct:      [de-identified]   Room:   2004/2004-01   Day:  3  Admit Date:  6/3/2023  6:08 PM    PCP:   Unknown Provider Result (Inactive)  Code Status:  Full Code    Subjective:     C/C:   Interval History Status: not changed. Patient seen and examined at bedside, no acute events overnight. Patient vitals, labs and all providers notes were reviewed,from overnight shift and morning updates were noted and discussed with the nurse    Brief History:     66-year-old male past medical history of orthostatic hypotension, hyperlipidemia presents to [facility] facility with shortness of breath found to have new onset A-fib patient was originally started on amiodarone drip underwent echo showing LVEF 15 to 20% with global hypokinesis of the left ventricle and moderate tricuspid regurgitation moderate to severe mitral regurgitation. Pateint was transferred to [facility] for further work up.  Patient was in afib while in transport but calcification of the aorta and branch vasculature. 3. 1.7 cm upper pole left renal cyst. 4. Slight hyperdensity in the gallbladder which could represent gallbladder sludge. 5. Cardiomegaly. Coronary artery disease. 6. Prominent mediastinal, right hilar and subcarinal lymph nodes. Physical Examination:        Physical Exam  Vitals and nursing note reviewed. Constitutional:       General: He is not in acute distress. HENT:      Head: Normocephalic and atraumatic. Eyes:      Conjunctiva/sclera: Conjunctivae normal.      Pupils: Pupils are equal, round, and reactive to light. Cardiovascular:      Rate and Rhythm: Normal rate and regular rhythm. Heart sounds: No murmur heard. Pulmonary:      Effort: Pulmonary effort is normal. No accessory muscle usage or respiratory distress. Breath sounds: No stridor. No decreased breath sounds, wheezing, rhonchi or rales. Abdominal:      General: Bowel sounds are normal. There is no distension. Palpations: Abdomen is soft. Abdomen is not rigid. Tenderness: There is no abdominal tenderness. There is no guarding. Musculoskeletal:         General: No tenderness. Skin:     General: Skin is warm and dry. Findings: No erythema, lesion or rash. Neurological:      Mental Status: He is alert and oriented to person, place, and time. Cranial Nerves: No cranial nerve deficit. Motor: No seizure activity. Psychiatric:         Speech: Speech normal.         Behavior: Behavior normal. Behavior is cooperative.        Assessment:        Hospital Problems             Last Modified POA    * (Principal) Acute systolic heart failure ([address] 6/3/2023 Yes    Cardiomyopathy ([address] 6/3/2023 Yes    Severe mitral regurgitation 6/3/2023 Yes    Moderate tricuspid regurgitation 6/3/2023 Yes    Smoker 6/3/2023 Yes    Subclinical hypothyroidism 6/4/2023 Yes    New onset atrial fibrillation ([address] 6/4/2023 Yes       Plan:            Acute systolic heart failure

## 2023-06-07 ENCOUNTER — APPOINTMENT (OUTPATIENT)
Dept: CARDIAC CATH/INVASIVE PROCEDURES | Age: 73
DRG: 287 | End: 2023-06-07
Attending: STUDENT IN AN ORGANIZED HEALTH CARE EDUCATION/TRAINING PROGRAM
Payer: MEDICARE

## 2023-06-07 LAB
ERYTHROCYTE [DISTWIDTH] IN BLOOD BY AUTOMATED COUNT: 13.2 % (ref 11.8–14.4)
HCT VFR BLD AUTO: 49.5 % (ref 40.7–50.3)
HGB BLD-MCNC: 16.1 G/DL (ref 13–17)
MCH RBC QN AUTO: 31.8 PG (ref 25.2–33.5)
MCHC RBC AUTO-ENTMCNC: 32.5 G/DL (ref 28.4–34.8)
MCV RBC AUTO: 97.8 FL (ref 82.6–102.9)
NRBC AUTOMATED: 0 PER 100 WBC
PARTIAL THROMBOPLASTIN TIME: 37.5 SEC (ref 23–36.5)
PARTIAL THROMBOPLASTIN TIME: 59.8 SEC (ref 23–36.5)
PARTIAL THROMBOPLASTIN TIME: 81.6 SEC (ref 23–36.5)
PLATELET # BLD AUTO: NORMAL K/UL (ref 138–453)
PLATELET, FLUORESCENCE: 120 K/UL (ref 138–453)
PLATELETS.RETICULATED NFR BLD AUTO: 3.5 % (ref 1.1–10.3)
RBC # BLD AUTO: 5.06 M/UL (ref 4.21–5.77)
WBC OTHER # BLD: 9.4 K/UL (ref 3.5–11.3)

## 2023-06-07 PROCEDURE — 99232 SBSQ HOSP IP/OBS MODERATE 35: CPT | Performed by: NURSE PRACTITIONER

## 2023-06-07 PROCEDURE — 85055 RETICULATED PLATELET ASSAY: CPT

## 2023-06-07 PROCEDURE — 6360000002 HC RX W HCPCS: Performed by: STUDENT IN AN ORGANIZED HEALTH CARE EDUCATION/TRAINING PROGRAM

## 2023-06-07 PROCEDURE — 2580000003 HC RX 258: Performed by: NURSE PRACTITIONER

## 2023-06-07 PROCEDURE — C1751 CATH, INF, PER/CENT/MIDLINE: HCPCS

## 2023-06-07 PROCEDURE — 3700000000 HC ANESTHESIA ATTENDED CARE

## 2023-06-07 PROCEDURE — C1894 INTRO/SHEATH, NON-LASER: HCPCS

## 2023-06-07 PROCEDURE — 2580000003 HC RX 258: Performed by: STUDENT IN AN ORGANIZED HEALTH CARE EDUCATION/TRAINING PROGRAM

## 2023-06-07 PROCEDURE — 85730 THROMBOPLASTIN TIME PARTIAL: CPT

## 2023-06-07 PROCEDURE — 1200000000 HC SEMI PRIVATE

## 2023-06-07 PROCEDURE — 3700000001 HC ADD 15 MINUTES (ANESTHESIA)

## 2023-06-07 PROCEDURE — 93456 R HRT CORONARY ARTERY ANGIO: CPT

## 2023-06-07 PROCEDURE — 85027 COMPLETE CBC AUTOMATED: CPT

## 2023-06-07 PROCEDURE — 36415 COLL VENOUS BLD VENIPUNCTURE: CPT

## 2023-06-07 PROCEDURE — 6360000004 HC RX CONTRAST MEDICATION

## 2023-06-07 PROCEDURE — 6360000002 HC RX W HCPCS: Performed by: NURSE ANESTHETIST, CERTIFIED REGISTERED

## 2023-06-07 PROCEDURE — B2111ZZ FLUOROSCOPY OF MULTIPLE CORONARY ARTERIES USING LOW OSMOLAR CONTRAST: ICD-10-PCS | Performed by: INTERNAL MEDICINE

## 2023-06-07 PROCEDURE — 6370000000 HC RX 637 (ALT 250 FOR IP): Performed by: NURSE PRACTITIONER

## 2023-06-07 PROCEDURE — 2500000003 HC RX 250 WO HCPCS: Performed by: NURSE ANESTHETIST, CERTIFIED REGISTERED

## 2023-06-07 PROCEDURE — 2580000003 HC RX 258: Performed by: ANESTHESIOLOGY

## 2023-06-07 PROCEDURE — C1892 INTRO/SHEATH,FIXED,PEEL-AWAY: HCPCS

## 2023-06-07 PROCEDURE — 6370000000 HC RX 637 (ALT 250 FOR IP): Performed by: SURGERY

## 2023-06-07 PROCEDURE — 4A023N8 MEASUREMENT OF CARDIAC SAMPLING AND PRESSURE, BILATERAL, PERCUTANEOUS APPROACH: ICD-10-PCS | Performed by: INTERNAL MEDICINE

## 2023-06-07 PROCEDURE — 2709999900 HC NON-CHARGEABLE SUPPLY

## 2023-06-07 PROCEDURE — 2500000003 HC RX 250 WO HCPCS

## 2023-06-07 PROCEDURE — 6370000000 HC RX 637 (ALT 250 FOR IP): Performed by: STUDENT IN AN ORGANIZED HEALTH CARE EDUCATION/TRAINING PROGRAM

## 2023-06-07 PROCEDURE — 6370000000 HC RX 637 (ALT 250 FOR IP): Performed by: FAMILY MEDICINE

## 2023-06-07 PROCEDURE — 2700000000 HC OXYGEN THERAPY PER DAY

## 2023-06-07 RX ORDER — SODIUM CHLORIDE 9 MG/ML
INJECTION, SOLUTION INTRAVENOUS PRN
Status: DISCONTINUED | OUTPATIENT
Start: 2023-06-07 | End: 2023-06-08 | Stop reason: HOSPADM

## 2023-06-07 RX ORDER — ONDANSETRON 2 MG/ML
4 INJECTION INTRAMUSCULAR; INTRAVENOUS
Status: DISCONTINUED | OUTPATIENT
Start: 2023-06-07 | End: 2023-06-08

## 2023-06-07 RX ORDER — PHENYLEPHRINE HCL IN 0.9% NACL 1 MG/10 ML
SYRINGE (ML) INTRAVENOUS PRN
Status: DISCONTINUED | OUTPATIENT
Start: 2023-06-07 | End: 2023-06-07 | Stop reason: SDUPTHER

## 2023-06-07 RX ORDER — LANOLIN ALCOHOL/MO/W.PET/CERES
400 CREAM (GRAM) TOPICAL ONCE
Status: COMPLETED | OUTPATIENT
Start: 2023-06-07 | End: 2023-06-07

## 2023-06-07 RX ORDER — LIDOCAINE HYDROCHLORIDE 10 MG/ML
INJECTION, SOLUTION INFILTRATION; PERINEURAL PRN
Status: DISCONTINUED | OUTPATIENT
Start: 2023-06-07 | End: 2023-06-07 | Stop reason: SDUPTHER

## 2023-06-07 RX ORDER — SODIUM CHLORIDE 0.9 % (FLUSH) 0.9 %
5-40 SYRINGE (ML) INJECTION PRN
Status: DISCONTINUED | OUTPATIENT
Start: 2023-06-07 | End: 2023-06-08 | Stop reason: HOSPADM

## 2023-06-07 RX ORDER — SODIUM CHLORIDE 0.9 % (FLUSH) 0.9 %
5-40 SYRINGE (ML) INJECTION EVERY 12 HOURS SCHEDULED
Status: DISCONTINUED | OUTPATIENT
Start: 2023-06-07 | End: 2023-06-09 | Stop reason: HOSPADM

## 2023-06-07 RX ORDER — SODIUM CHLORIDE 0.9 % (FLUSH) 0.9 %
5-40 SYRINGE (ML) INJECTION PRN
Status: DISCONTINUED | OUTPATIENT
Start: 2023-06-07 | End: 2023-06-09 | Stop reason: HOSPADM

## 2023-06-07 RX ORDER — SODIUM CHLORIDE, SODIUM LACTATE, POTASSIUM CHLORIDE, CALCIUM CHLORIDE 600; 310; 30; 20 MG/100ML; MG/100ML; MG/100ML; MG/100ML
INJECTION, SOLUTION INTRAVENOUS CONTINUOUS PRN
Status: DISCONTINUED | OUTPATIENT
Start: 2023-06-07 | End: 2023-06-07 | Stop reason: SDUPTHER

## 2023-06-07 RX ORDER — DIPHENHYDRAMINE HYDROCHLORIDE 50 MG/ML
12.5 INJECTION INTRAMUSCULAR; INTRAVENOUS
Status: DISCONTINUED | OUTPATIENT
Start: 2023-06-07 | End: 2023-06-08

## 2023-06-07 RX ORDER — FENTANYL CITRATE 50 UG/ML
25 INJECTION, SOLUTION INTRAMUSCULAR; INTRAVENOUS EVERY 5 MIN PRN
Status: DISCONTINUED | OUTPATIENT
Start: 2023-06-07 | End: 2023-06-08 | Stop reason: HOSPADM

## 2023-06-07 RX ORDER — FENTANYL CITRATE 50 UG/ML
50 INJECTION, SOLUTION INTRAMUSCULAR; INTRAVENOUS EVERY 5 MIN PRN
Status: DISCONTINUED | OUTPATIENT
Start: 2023-06-07 | End: 2023-06-08 | Stop reason: HOSPADM

## 2023-06-07 RX ORDER — SODIUM CHLORIDE 9 MG/ML
INJECTION, SOLUTION INTRAVENOUS PRN
Status: DISCONTINUED | OUTPATIENT
Start: 2023-06-07 | End: 2023-06-09 | Stop reason: HOSPADM

## 2023-06-07 RX ORDER — SODIUM CHLORIDE 0.9 % (FLUSH) 0.9 %
5-40 SYRINGE (ML) INJECTION EVERY 12 HOURS SCHEDULED
Status: DISCONTINUED | OUTPATIENT
Start: 2023-06-07 | End: 2023-06-08 | Stop reason: HOSPADM

## 2023-06-07 RX ORDER — PROPOFOL 10 MG/ML
INJECTION, EMULSION INTRAVENOUS PRN
Status: DISCONTINUED | OUTPATIENT
Start: 2023-06-07 | End: 2023-06-07 | Stop reason: SDUPTHER

## 2023-06-07 RX ADMIN — SPIRONOLACTONE 25 MG: 25 TABLET, FILM COATED ORAL at 08:56

## 2023-06-07 RX ADMIN — MAGNESIUM GLUCONATE 500 MG ORAL TABLET 400 MG: 500 TABLET ORAL at 12:16

## 2023-06-07 RX ADMIN — SACUBITRIL AND VALSARTAN 1 TABLET: 24; 26 TABLET, FILM COATED ORAL at 20:06

## 2023-06-07 RX ADMIN — SODIUM CHLORIDE, SODIUM LACTATE, POTASSIUM CHLORIDE, CALCIUM CHLORIDE: 600; 310; 30; 20 INJECTION, SOLUTION INTRAVENOUS at 10:28

## 2023-06-07 RX ADMIN — PANTOPRAZOLE SODIUM 40 MG: 40 TABLET, DELAYED RELEASE ORAL at 08:00

## 2023-06-07 RX ADMIN — SODIUM CHLORIDE, PRESERVATIVE FREE 10 ML: 5 INJECTION INTRAVENOUS at 20:06

## 2023-06-07 RX ADMIN — HEPARIN SODIUM 2000 UNITS: 1000 INJECTION INTRAVENOUS; SUBCUTANEOUS at 22:54

## 2023-06-07 RX ADMIN — LIDOCAINE HYDROCHLORIDE 5 ML: 10 INJECTION, SOLUTION EPIDURAL; INFILTRATION; INTRACAUDAL; PERINEURAL at 10:28

## 2023-06-07 RX ADMIN — SACUBITRIL AND VALSARTAN 1 TABLET: 24; 26 TABLET, FILM COATED ORAL at 08:56

## 2023-06-07 RX ADMIN — ASPIRIN 81 MG 81 MG: 81 TABLET ORAL at 08:56

## 2023-06-07 RX ADMIN — Medication 100 MCG: at 10:38

## 2023-06-07 RX ADMIN — PROPOFOL 50 MG: 10 INJECTION, EMULSION INTRAVENOUS at 10:32

## 2023-06-07 RX ADMIN — METOPROLOL TARTRATE 50 MG: 50 TABLET, FILM COATED ORAL at 08:56

## 2023-06-07 RX ADMIN — PROPOFOL 100 MG: 10 INJECTION, EMULSION INTRAVENOUS at 10:29

## 2023-06-07 RX ADMIN — ATORVASTATIN CALCIUM 80 MG: 80 TABLET, FILM COATED ORAL at 20:06

## 2023-06-07 RX ADMIN — PREDNISONE 40 MG: 20 TABLET ORAL at 08:56

## 2023-06-07 ASSESSMENT — ENCOUNTER SYMPTOMS
VOMITING: 0
SHORTNESS OF BREATH: 0
DIARRHEA: 0
BLOOD IN STOOL: 0
ABDOMINAL PAIN: 0
CHEST TIGHTNESS: 0
NAUSEA: 0
COUGH: 0
CONSTIPATION: 0
WHEEZING: 0

## 2023-06-07 ASSESSMENT — PAIN SCALES - GENERAL
PAINLEVEL_OUTOF10: 0

## 2023-06-07 NOTE — CONSULTS
[facility]    GASTROENTEROLOGY CONSULT    Patient:   Angelic Souza   :    1950   Facility:   [address]   Date:    2023  Admission Dx:  Cardiomyopathy [facility]) [I42.9]  Requesting physician: Fermin Page MD  Reason for Consult: GI eval for endoscopy  Chief Complaint : N/A    SUBJECTIVE     HISTORY OF PRESENT ILLNESS  Angelic Souza is a 68 y.o. male who was admitted for management of CHF, severe MR and was planned for JEAN PIERRE however, unable to pass after 2 attempts. Patient denied any history of dysphagia, caustic ingestion, trauma, regurgitation, radiation, or any throat infection in the past.  Patient denied any special diet. Reported long history of cigarette smoking. Occasional drinking. Denied drug use. Labs stable      OBJECTIVE:     PAST MEDICAL/SURGICAL HISTORY  History reviewed. No pertinent past medical history. History reviewed. No pertinent surgical history.     ALLERGIES:  No Known Allergies    HOME MEDICATIONS:  Prior to Admission medications    Not on File       CURRENT MEDICATIONS:  Scheduled Meds:   [Held by provider] apixaban  5 mg Oral BID    sodium chloride flush  5-40 mL IntraVENous 2 times per day    metoprolol tartrate  50 mg Oral BID    predniSONE  40 mg Oral Daily    sacubitril-valsartan  1 tablet Oral BID    spironolactone  25 mg Oral Daily    sodium chloride flush  5-40 mL IntraVENous 2 times per day    atorvastatin  80 mg Oral Nightly    aspirin  81 mg Oral Daily    pantoprazole  40 mg Oral QAM AC     Continuous Infusions:   sodium chloride      sodium chloride      amiodarone 0.5 mg/min (23 4548)    heparin (PORCINE) Infusion 5 Units/kg/hr (23 0414)     PRN Meds:sodium chloride flush, sodium chloride, fentanNYL, fentanNYL, ondansetron, diphenhydrAMINE, metoprolol, sodium chloride flush, sodium chloride, potassium chloride **OR** potassium alternative oral replacement **OR** potassium chloride, magnesium sulfate, aorta and branch vasculature. 3. 1.7 cm upper pole left renal cyst. 4. Slight hyperdensity in the gallbladder which could represent gallbladder sludge. 5. Cardiomegaly. Coronary artery disease. 6. Prominent mediastinal, right hilar and subcarinal lymph nodes. IMPRESSION:     Inability to pass JEAN PIERRE, DDx tortuous esophagus, tumor, or stricture. CHF with severe MR  A-fib on heparin  COPD    Old records, labs and imaging reviewed. PLAN     We will proceed with EGD. Further recommendations to follow. Will discuss the case with Dr. Asha Armstrong . Thank you for this interesting consult. We will continue to follow.     Koko Suarez MD,   Internal medicine resident, PGY 1,  GI service,  [address],  6/7/2023

## 2023-06-07 NOTE — OP NOTE
[facility]    CARDIAC CATHETERIZATION    Date:   6/7/2023  Patient name:  Keeley Benoit  Date of admission:  6/3/2023  6:08 PM  MRN:   [id number]  YOB: 1950    Operators:  Primary:   iWlliam Cisneros MD (Attending Physician)     Assistant/CV fellow: Valorie Castillo MD      Procedure performed:     [x] Left Heart Catheterization. [] Graft Angiography. [x] Left Ventriculography. [x] Right Heart Catheterization. [x] Coronary Angiography. [] Aortic Valve Studies. [] PCI:      [] Other:       Pre Procedure Conscious Sedation Data:  ASA Class:    [] I [x] II [] III [] IV    Mallampati Class:  [] I [] II [x] III [] IV      Indication:  [] STEMI      [] + Stress test  [] ACS      [] + EKG Changes  [] Non Q MI       [x] Significant Risk Factors  [] Recurrent Angina             [] Diabetes Mellitus    [] New LBBB      [] Other.  [] CHF / Low EF changes     [] Abnormal CTA / Ca Score      Procedure:  Access:  [x] Femoral    [] Radial  artery       [x] Right    [] Left    [x] Ultrasound used    Procedure: After informed consent was obtained with explanation of the risks and benefits, patient was brought to the cath lab. The access area was prepped and draped in sterile fashion. 1% lidocaine was used for local block. The artery was cannulated with 6  Fr sheath with brisk arterial blood return. The side port was frequently flushed and aspirated with normal saline. Estimated Blood Loss:  [x] Minimal < 25 cc [] Moderate 25-50 cc  [] >50 cc    Findings:  Angiographic Findings        Cardiac Arteries and Lesion Findings       LMCA: No left main, separate LAD and LCX ostia. LAD: 70% mid stenosis. LCx: Mild irregularities 10-20%. Large OM1, upper branch has 60% stenosis,   low branch has 80% stenosis. RCA: Mild irregularities 10-20%.       Coronary Tree        Dominance: Right     Oxygen Saturation   +--------+-----+----+----------------------+---+---------------------------+ Fib   [] Olga V. Tach           [] NS V. T   [] New LBBB           [] T. Inv  []  ST dev > 0.5 mm         [] PVC's freq  [] PVC's infrequent    Stress Test Performed:      [] Yes    [x] No     Type:     [] Stress Echo   [] Exercise Stress Test (no imaging)      [] Stress Nuclear  [] Stress Imaging     Results   [] Negative   [] Positive        [] Indeterminate  [] Unavailable     If Positive/ Risk / Extent of Ischemia:       [] Low  [] Intermediate         [] High  [] Unavailable      Cardiac CTA Performed:     [] Yes    [x] No      Results   [] CAD   [] Non obstructive CAD      [] No CAD   [] Uncertain      [] Unknown   [] Structural Disease. Pre Procedure Medications:   [] Yes    [x] No         [] ASA   [] Beta Blockers      [] Nitrate   [] Ca Channel Blockers      [] Ranolazine   [] Statin       [] Plavix/Others antiplatelets      Electronically signed on 6/7/2023 at 12:35 PM by:    Wallace Stewart MD  Fellow, [facility].  Dawit Monaco [facility]

## 2023-06-07 NOTE — PROGRESS NOTES
[facility]  Office: [address], DO, Suzanne Lewis, DO, Jaleel King, DO, Bebeto Barragan Blood, DO, Anthony Baker MD, Dahiana Duran MD, Marie Shipley MD, Skyler Plasencia MD,  Mazin Bran MD, Jaswant Ross MD, Thierry Sanchez, DO, Tanner Sam MD,  Afua Al MD, Rinku Alvarez MD, Sissy Bolton, DO, Alaina Fournier MD, Terri Gonzalez MD, Nic Killian, DO, Shelby Morales MD, Rigoberto Arzola MD, Marleen Harada, MD, Juliet Barnett MD,  Johnny Holstein, DO, Sanjiv Garner MD,  Stevo Trevizo, CNP,  Nicola Jiang, CNP, Albert Cortez, CNP, Germania Ivy, CNP,  Raphael Cleveland, [facility], Laura Willoughby, [facility], Daniel Pacheco, [facility], Mikaela Palma CNP, Lanita Cogan, CNP, Feliciano Lilly CNP, Francisco J Esquivel PA-C, Concepción Harrison, CNS, Ellen Beltre, CNP, Lisa Batista, [address] [place] [address]    Progress Note    6/7/2023    8:32 AM    Name:   Mei George  MRN:     [id number]     Acct:      [de-identified]   Room:   2004/2004-01   Day:  4  Admit Date:  6/3/2023  6:08 PM    PCP:   Unknown Provider Result (Inactive)  Code Status:  Full Code    Subjective:     C/C:   Interval History Status: not changed. Patient seen and examined at bedside, no acute events overnight. Wife in room at time of assessment. HR fluctuating between low 100 and mid 80's irregular on bedside tele. Patient is status post left and right cardiac cath, unsuccessful JEAN PIERRE secondary to possible esophageal stricture. GI has been consulted by cardiology for further assistance in management.   Patient anxious over staying in the hospital for prolonged period of time otherwise states no concerns at this time    Brief History:     77-year-old male past medical history of orthostatic hypotension, hyperlipidemia presents to [facility] facility with shortness of breath found to have new onset A-fib patient was originally started on amiodarone drip

## 2023-06-07 NOTE — ANESTHESIA POSTPROCEDURE EVALUATION
Department of Anesthesiology  Postprocedure Note    Patient: Seven Gonzalez  MRN: [id number]  YOB: 1950  Date of evaluation: 6/7/2023      Procedure Summary     Date: 06/07/23 Room / Location: [facility] Cath Lab    Anesthesia Start: 5086 Anesthesia Stop: 5059    Procedure: CATH LAB WITH ANESTHESIA Diagnosis:     Scheduled Providers: Karen Pederson MD; Lalito Naranjo APRN - CRNA Responsible Provider: Karen Pederson MD    Anesthesia Type: MAC ASA Status: 4          Anesthesia Type: No value filed.     Constantino Phase I:      Constantino Phase II:        Anesthesia Post Evaluation    Patient location during evaluation: PACU  Patient participation: complete - patient participated  Level of consciousness: awake  Pain score: 1  Airway patency: patent  Nausea & Vomiting: no nausea and no vomiting  Complications: no  Cardiovascular status: blood pressure returned to baseline and hemodynamically stable  Respiratory status: acceptable  Hydration status: euvolemic

## 2023-06-07 NOTE — PLAN OF CARE
Attempted to do JEAN PIERRE, under general anesathesia, this is the second attempt, unable to intubate. Procedure aborted, would appreciate GI recommendations and further evaluation.  Consult in and GI contacted

## 2023-06-08 ENCOUNTER — ANESTHESIA EVENT (OUTPATIENT)
Dept: OPERATING ROOM | Age: 73
End: 2023-06-08
Payer: MEDICARE

## 2023-06-08 ENCOUNTER — ANESTHESIA (OUTPATIENT)
Dept: OPERATING ROOM | Age: 73
End: 2023-06-08
Payer: MEDICARE

## 2023-06-08 LAB
ANION GAP SERPL CALCULATED.3IONS-SCNC: 9 MMOL/L (ref 9–17)
BUN SERPL-MCNC: 19 MG/DL (ref 8–23)
CALCIUM SERPL-MCNC: 8.7 MG/DL (ref 8.6–10.4)
CHLORIDE SERPL-SCNC: 104 MMOL/L (ref 98–107)
CO2 SERPL-SCNC: 20 MMOL/L (ref 20–31)
CREAT SERPL-MCNC: 0.83 MG/DL (ref 0.7–1.2)
GFR SERPL CREATININE-BSD FRML MDRD: >60 ML/MIN/1.73M2
GLUCOSE SERPL-MCNC: 89 MG/DL (ref 70–99)
PARTIAL THROMBOPLASTIN TIME: 36.8 SEC (ref 23–36.5)
PARTIAL THROMBOPLASTIN TIME: 47.4 SEC (ref 23–36.5)
PARTIAL THROMBOPLASTIN TIME: 90.7 SEC (ref 23–36.5)
POTASSIUM SERPL-SCNC: 4.5 MMOL/L (ref 3.7–5.3)
SODIUM SERPL-SCNC: 133 MMOL/L (ref 135–144)

## 2023-06-08 PROCEDURE — 6370000000 HC RX 637 (ALT 250 FOR IP): Performed by: INTERNAL MEDICINE

## 2023-06-08 PROCEDURE — 6360000002 HC RX W HCPCS: Performed by: INTERNAL MEDICINE

## 2023-06-08 PROCEDURE — 2500000003 HC RX 250 WO HCPCS: Performed by: SPECIALIST

## 2023-06-08 PROCEDURE — 2580000003 HC RX 258: Performed by: INTERNAL MEDICINE

## 2023-06-08 PROCEDURE — 43239 EGD BIOPSY SINGLE/MULTIPLE: CPT | Performed by: INTERNAL MEDICINE

## 2023-06-08 PROCEDURE — 85730 THROMBOPLASTIN TIME PARTIAL: CPT

## 2023-06-08 PROCEDURE — 2700000000 HC OXYGEN THERAPY PER DAY

## 2023-06-08 PROCEDURE — 99232 SBSQ HOSP IP/OBS MODERATE 35: CPT | Performed by: FAMILY MEDICINE

## 2023-06-08 PROCEDURE — 88305 TISSUE EXAM BY PATHOLOGIST: CPT

## 2023-06-08 PROCEDURE — 2709999900 HC NON-CHARGEABLE SUPPLY: Performed by: INTERNAL MEDICINE

## 2023-06-08 PROCEDURE — 2580000003 HC RX 258: Performed by: NURSE PRACTITIONER

## 2023-06-08 PROCEDURE — 7100000000 HC PACU RECOVERY - FIRST 15 MIN: Performed by: INTERNAL MEDICINE

## 2023-06-08 PROCEDURE — 6370000000 HC RX 637 (ALT 250 FOR IP): Performed by: STUDENT IN AN ORGANIZED HEALTH CARE EDUCATION/TRAINING PROGRAM

## 2023-06-08 PROCEDURE — 36415 COLL VENOUS BLD VENIPUNCTURE: CPT

## 2023-06-08 PROCEDURE — 3700000000 HC ANESTHESIA ATTENDED CARE: Performed by: INTERNAL MEDICINE

## 2023-06-08 PROCEDURE — 88342 IMHCHEM/IMCYTCHM 1ST ANTB: CPT

## 2023-06-08 PROCEDURE — 6370000000 HC RX 637 (ALT 250 FOR IP): Performed by: NURSE PRACTITIONER

## 2023-06-08 PROCEDURE — 2060000000 HC ICU INTERMEDIATE R&B

## 2023-06-08 PROCEDURE — 80048 BASIC METABOLIC PNL TOTAL CA: CPT

## 2023-06-08 PROCEDURE — 3700000001 HC ADD 15 MINUTES (ANESTHESIA): Performed by: INTERNAL MEDICINE

## 2023-06-08 PROCEDURE — 93005 ELECTROCARDIOGRAM TRACING: CPT | Performed by: INTERNAL MEDICINE

## 2023-06-08 PROCEDURE — 2580000003 HC RX 258: Performed by: STUDENT IN AN ORGANIZED HEALTH CARE EDUCATION/TRAINING PROGRAM

## 2023-06-08 PROCEDURE — 94761 N-INVAS EAR/PLS OXIMETRY MLT: CPT

## 2023-06-08 PROCEDURE — 2500000003 HC RX 250 WO HCPCS: Performed by: INTERNAL MEDICINE

## 2023-06-08 PROCEDURE — 3609012400 HC EGD TRANSORAL BIOPSY SINGLE/MULTIPLE: Performed by: INTERNAL MEDICINE

## 2023-06-08 PROCEDURE — 6360000002 HC RX W HCPCS: Performed by: SPECIALIST

## 2023-06-08 PROCEDURE — 2580000003 HC RX 258: Performed by: SPECIALIST

## 2023-06-08 PROCEDURE — 0DB68ZX EXCISION OF STOMACH, VIA NATURAL OR ARTIFICIAL OPENING ENDOSCOPIC, DIAGNOSTIC: ICD-10-PCS | Performed by: INTERNAL MEDICINE

## 2023-06-08 PROCEDURE — 7100000001 HC PACU RECOVERY - ADDTL 15 MIN: Performed by: INTERNAL MEDICINE

## 2023-06-08 PROCEDURE — 6370000000 HC RX 637 (ALT 250 FOR IP): Performed by: FAMILY MEDICINE

## 2023-06-08 RX ORDER — SODIUM CHLORIDE 9 MG/ML
INJECTION, SOLUTION INTRAVENOUS PRN
Status: DISCONTINUED | OUTPATIENT
Start: 2023-06-08 | End: 2023-06-09 | Stop reason: HOSPADM

## 2023-06-08 RX ORDER — SODIUM CHLORIDE 0.9 % (FLUSH) 0.9 %
5-40 SYRINGE (ML) INJECTION EVERY 12 HOURS SCHEDULED
Status: DISCONTINUED | OUTPATIENT
Start: 2023-06-08 | End: 2023-06-09 | Stop reason: HOSPADM

## 2023-06-08 RX ORDER — SODIUM CHLORIDE 0.9 % (FLUSH) 0.9 %
5-40 SYRINGE (ML) INJECTION PRN
Status: DISCONTINUED | OUTPATIENT
Start: 2023-06-08 | End: 2023-06-09 | Stop reason: HOSPADM

## 2023-06-08 RX ORDER — METOPROLOL TARTRATE 5 MG/5ML
2.5 INJECTION INTRAVENOUS
Status: DISCONTINUED | OUTPATIENT
Start: 2023-06-08 | End: 2023-06-09 | Stop reason: HOSPADM

## 2023-06-08 RX ORDER — PREDNISONE 20 MG/1
20 TABLET ORAL DAILY
Status: CANCELLED | OUTPATIENT
Start: 2023-06-09 | End: 2023-06-10

## 2023-06-08 RX ORDER — SODIUM CHLORIDE, SODIUM LACTATE, POTASSIUM CHLORIDE, CALCIUM CHLORIDE 600; 310; 30; 20 MG/100ML; MG/100ML; MG/100ML; MG/100ML
INJECTION, SOLUTION INTRAVENOUS CONTINUOUS PRN
Status: DISCONTINUED | OUTPATIENT
Start: 2023-06-08 | End: 2023-06-08 | Stop reason: SDUPTHER

## 2023-06-08 RX ORDER — LIDOCAINE HYDROCHLORIDE 10 MG/ML
1 INJECTION, SOLUTION INFILTRATION; PERINEURAL
Status: ACTIVE | OUTPATIENT
Start: 2023-06-08 | End: 2023-06-09

## 2023-06-08 RX ORDER — PROPOFOL 10 MG/ML
INJECTION, EMULSION INTRAVENOUS PRN
Status: DISCONTINUED | OUTPATIENT
Start: 2023-06-08 | End: 2023-06-08 | Stop reason: SDUPTHER

## 2023-06-08 RX ORDER — MIDAZOLAM HYDROCHLORIDE 2 MG/2ML
1 INJECTION, SOLUTION INTRAMUSCULAR; INTRAVENOUS EVERY 10 MIN PRN
Status: DISCONTINUED | OUTPATIENT
Start: 2023-06-08 | End: 2023-06-09 | Stop reason: HOSPADM

## 2023-06-08 RX ORDER — ONDANSETRON 2 MG/ML
4 INJECTION INTRAMUSCULAR; INTRAVENOUS
Status: ACTIVE | OUTPATIENT
Start: 2023-06-08 | End: 2023-06-09

## 2023-06-08 RX ORDER — LIDOCAINE HYDROCHLORIDE 10 MG/ML
INJECTION, SOLUTION EPIDURAL; INFILTRATION; INTRACAUDAL; PERINEURAL PRN
Status: DISCONTINUED | OUTPATIENT
Start: 2023-06-08 | End: 2023-06-08 | Stop reason: SDUPTHER

## 2023-06-08 RX ADMIN — SPIRONOLACTONE 25 MG: 25 TABLET, FILM COATED ORAL at 08:24

## 2023-06-08 RX ADMIN — ASPIRIN 81 MG 81 MG: 81 TABLET ORAL at 08:24

## 2023-06-08 RX ADMIN — SODIUM CHLORIDE, POTASSIUM CHLORIDE, SODIUM LACTATE AND CALCIUM CHLORIDE: 600; 310; 30; 20 INJECTION, SOLUTION INTRAVENOUS at 11:36

## 2023-06-08 RX ADMIN — SACUBITRIL AND VALSARTAN 1 TABLET: 24; 26 TABLET, FILM COATED ORAL at 19:57

## 2023-06-08 RX ADMIN — HEPARIN SODIUM 2000 UNITS: 1000 INJECTION INTRAVENOUS; SUBCUTANEOUS at 23:52

## 2023-06-08 RX ADMIN — HEPARIN SODIUM 2000 UNITS: 1000 INJECTION INTRAVENOUS; SUBCUTANEOUS at 17:00

## 2023-06-08 RX ADMIN — SODIUM CHLORIDE, PRESERVATIVE FREE 10 ML: 5 INJECTION INTRAVENOUS at 08:24

## 2023-06-08 RX ADMIN — LIDOCAINE HYDROCHLORIDE 50 MG: 10 INJECTION, SOLUTION EPIDURAL; INFILTRATION; INTRACAUDAL; PERINEURAL at 11:41

## 2023-06-08 RX ADMIN — PREDNISONE 40 MG: 20 TABLET ORAL at 08:25

## 2023-06-08 RX ADMIN — PROPOFOL 20 MG: 10 INJECTION, EMULSION INTRAVENOUS at 11:41

## 2023-06-08 RX ADMIN — PROPOFOL 20 MG: 10 INJECTION, EMULSION INTRAVENOUS at 11:42

## 2023-06-08 RX ADMIN — PANTOPRAZOLE SODIUM 40 MG: 40 TABLET, DELAYED RELEASE ORAL at 08:24

## 2023-06-08 RX ADMIN — HEPARIN SODIUM 8 UNITS/KG/HR: 10000 INJECTION, SOLUTION INTRAVENOUS at 23:52

## 2023-06-08 RX ADMIN — SODIUM CHLORIDE, PRESERVATIVE FREE 10 ML: 5 INJECTION INTRAVENOUS at 19:57

## 2023-06-08 RX ADMIN — PROPOFOL 20 MG: 10 INJECTION, EMULSION INTRAVENOUS at 11:43

## 2023-06-08 RX ADMIN — SACUBITRIL AND VALSARTAN 1 TABLET: 24; 26 TABLET, FILM COATED ORAL at 08:25

## 2023-06-08 RX ADMIN — SODIUM CHLORIDE, PRESERVATIVE FREE 10 ML: 5 INJECTION INTRAVENOUS at 08:25

## 2023-06-08 RX ADMIN — METOPROLOL TARTRATE 5 MG: 1 INJECTION, SOLUTION INTRAVENOUS at 17:34

## 2023-06-08 RX ADMIN — AMIODARONE HYDROCHLORIDE 0.5 MG/MIN: 50 INJECTION, SOLUTION INTRAVENOUS at 18:55

## 2023-06-08 RX ADMIN — ATORVASTATIN CALCIUM 80 MG: 80 TABLET, FILM COATED ORAL at 19:57

## 2023-06-08 ASSESSMENT — ENCOUNTER SYMPTOMS
VOMITING: 0
WHEEZING: 0
COUGH: 0
NAUSEA: 0
CHEST TIGHTNESS: 0
CONSTIPATION: 0
SHORTNESS OF BREATH: 1
BLOOD IN STOOL: 0
DIARRHEA: 0
ABDOMINAL PAIN: 0

## 2023-06-08 ASSESSMENT — PAIN - FUNCTIONAL ASSESSMENT: PAIN_FUNCTIONAL_ASSESSMENT: 0-10

## 2023-06-08 NOTE — PROGRESS NOTES
atelectasis from moderate bilateral pleural effusions. Underlying infiltrate not excluded, especially in the left lower lobe. Mild underlying emphysema. 2. Atherosclerotic calcification of the aorta and branch vasculature. 3. 1.7 cm upper pole left renal cyst. 4. Slight hyperdensity in the gallbladder which could represent gallbladder sludge. 5. Cardiomegaly. Coronary artery disease. 6. Prominent mediastinal, right hilar and subcarinal lymph nodes. Physical Examination:        Physical Exam  Vitals and nursing note reviewed. Constitutional:       General: He is not in acute distress. HENT:      Head: Normocephalic and atraumatic. Eyes:      Conjunctiva/sclera: Conjunctivae normal.      Pupils: Pupils are equal, round, and reactive to light. Cardiovascular:      Rate and Rhythm: Normal rate and regular rhythm. Heart sounds: No murmur heard. Pulmonary:      Effort: Pulmonary effort is normal. No accessory muscle usage or respiratory distress. Breath sounds: No stridor. No decreased breath sounds, wheezing, rhonchi or rales. Abdominal:      General: Bowel sounds are normal. There is no distension. Palpations: Abdomen is soft. Abdomen is not rigid. Tenderness: There is no abdominal tenderness. There is no guarding. Musculoskeletal:         General: No tenderness. Skin:     General: Skin is warm and dry. Findings: No erythema, lesion or rash. Neurological:      Mental Status: He is alert and oriented to person, place, and time. Cranial Nerves: No cranial nerve deficit. Motor: No seizure activity. Psychiatric:         Speech: Speech normal.         Behavior: Behavior normal. Behavior is cooperative.        Assessment:        Hospital Problems             Last Modified POA    * (Principal) Acute systolic heart failure ([address] 6/3/2023 Yes    Cardiomyopathy ([address] 6/3/2023 Yes    Severe mitral regurgitation 6/3/2023 Yes    Moderate tricuspid regurgitation 6/3/2023

## 2023-06-08 NOTE — OP NOTE
and possible risks and complications of the procedure, and alternatives. Questions were answered. The patient's history was reviewed and a directed physical examination was performed prior to the procedure. Patient was monitored throughout the procedure with pulse oximetry and periodic assessment of vital signs. Patient was sedated as noted above. With the patient in the left lateral decubitus position, the Olympus videoendoscope was placed in the patient's mouth and under direct visualization passed into the esophagus. Visualization of the esophagus, stomach, and duodenum was performed during both introduction and withdrawal of the endoscope and retroflexed view of the proximal stomach was obtained. The scope was passed to the 2nd portion of the duodenum. The patient tolerated the procedure well and was taken to the recovery area in good condition. The patient  was taken to the recovery area in good condition.      Electronically signed by Surekha Boykin MD on 6/8/2023 at 12:25 PM

## 2023-06-08 NOTE — CARE COORDINATION
Spoke with pt and wife regarding transitional plan of care. Pt plans to go home with wife, denies needs for SNF or HHC. Pt has ride home. Per cardiology pt may need wearable defibrillator, however it has not been ordered yet.

## 2023-06-08 NOTE — ANESTHESIA POSTPROCEDURE EVALUATION
Department of Anesthesiology  Postprocedure Note    Patient: Marcella Flowers  MRN: [id number]  YOB: 1950  Date of evaluation: 6/8/2023      Procedure Summary     Date: 06/08/23 Room / Location: [facility] 04 / 2100 \[facility] [place] [facility]\""    Anesthesia Start: 4702 Anesthesia Stop: 1798    Procedure: EGD BIOPSY Diagnosis:       Status post dilation of esophageal narrowing      (ESOPHAGEAL NARROWING)    Surgeons: Pawel Krueger MD Responsible Provider: Diane Maxwell MD    Anesthesia Type: MAC ASA Status: 4          Anesthesia Type: MAC    Constantino Phase I:      Constantino Phase II:        Anesthesia Post Evaluation    Patient location during evaluation: PACU  Patient participation: complete - patient participated  Level of consciousness: awake and alert  Pain score: 0  Airway patency: patent  Nausea & Vomiting: no vomiting and no nausea  Complications: no  Cardiovascular status: hemodynamically stable  Respiratory status: acceptable  Hydration status: stable

## 2023-06-08 NOTE — ANESTHESIA PRE PROCEDURE
(ENTRESTO) 24-26 MG per tablet 1 tablet  1 tablet Oral BID Tonja Cardoso MD   1 tablet at 06/07/23 2006    spironolactone (ALDACTONE) tablet 25 mg  25 mg Oral Daily Tonja Cardoso MD   25 mg at 06/07/23 0856    sodium chloride flush 0.9 % injection 5-40 mL  5-40 mL IntraVENous 2 times per day Graham Womack, APRN - NP   10 mL at 06/07/23 2006    sodium chloride flush 0.9 % injection 10 mL  10 mL IntraVENous PRN Jorene Chime, APRN - NP        0.9 % sodium chloride infusion   IntraVENous PRN Jomeredith Chime, APRN - NP        potassium chloride (KLOR-CON M) extended release tablet 40 mEq  40 mEq Oral PRN Jorene Chime, APRN - NP        Or   Idella Hard potassium bicarb-citric acid (EFFER-K) effervescent tablet 40 mEq  40 mEq Oral PRN Jorene Chime, APRN - NP        Or   Idella Hard potassium chloride 10 mEq/100 mL IVPB (Peripheral Line)  10 mEq IntraVENous PRN Graham Chime, APRN - NP        magnesium sulfate 1000 mg in dextrose 5% 100 mL IVPB  1,000 mg IntraVENous PRN Jorene Chime, APRN - NP        ondansetron (ZOFRAN-ODT) disintegrating tablet 4 mg  4 mg Oral Q8H PRN Graham Chime, APRN - NP        Or    ondansetron [facility]) injection 4 mg  4 mg IntraVENous Q6H PRN Graham Chime, APRN - NP        acetaminophen (TYLENOL) tablet 650 mg  650 mg Oral Q6H PRN Graham Chime, APRN - NP        Or   Idella Hard acetaminophen (TYLENOL) suppository 650 mg  650 mg Rectal Q6H PRN Graham Chime, APRN - NP        magnesium hydroxide (MILK OF MAGNESIA) 400 MG/5ML suspension 30 mL  30 mL Oral Daily PRN Jorene Chime, APRN - NP        atorvastatin (LIPITOR) tablet 80 mg  80 mg Oral Nightly Graham Lepee, APRN - NP   80 mg at 06/07/23 2006    aspirin chewable tablet 81 mg  81 mg Oral Daily Graham Chime, APRN - NP   81 mg at 06/07/23 0856    amiodarone (CORDARONE) 450 mg in dextrose 5 % 250 mL infusion  0.5 mg/min IntraVENous Continuous Yosef Desouza MD 16.7 mL/hr at 06/06/23 1838 0.5 mg/min at 06/06/23 1830   

## 2023-06-08 NOTE — PLAN OF CARE
Problem: Discharge Planning  Goal: Discharge to home or other facility with appropriate resources  6/8/2023 1747 by Neil Long RN  Outcome: Progressing     Problem: Chronic Conditions and Co-morbidities  Goal: Patient's chronic conditions and co-morbidity symptoms are monitored and maintained or improved  6/8/2023 1747 by Neil Long RN  Outcome: Progressing     Problem: Pain  Goal: Verbalizes/displays adequate comfort level or baseline comfort level  6/8/2023 1747 by Neil Long RN  Outcome: Progressing     Problem: Skin/Tissue Integrity  Goal: Absence of new skin breakdown  Description: 1. Monitor for areas of redness and/or skin breakdown  2. Assess vascular access sites hourly  3. Every 4-6 hours minimum:  Change oxygen saturation probe site  4. Every 4-6 hours:  If on nasal continuous positive airway pressure, respiratory therapy assess nares and determine need for appliance change or resting period.   6/8/2023 1747 by Neil Long RN  Outcome: Progressing     Problem: ABCDS Injury Assessment  Goal: Absence of physical injury  Outcome: Progressing

## 2023-06-09 VITALS
SYSTOLIC BLOOD PRESSURE: 105 MMHG | BODY MASS INDEX: 25.06 KG/M2 | WEIGHT: 179.01 LBS | HEART RATE: 60 BPM | HEIGHT: 71 IN | DIASTOLIC BLOOD PRESSURE: 60 MMHG | OXYGEN SATURATION: 92 % | RESPIRATION RATE: 20 BRPM | TEMPERATURE: 97.5 F

## 2023-06-09 LAB
EKG ATRIAL RATE: 312 BPM
EKG Q-T INTERVAL: 296 MS
EKG QRS DURATION: 112 MS
EKG QTC CALCULATION (BAZETT): 373 MS
EKG R AXIS: -34 DEGREES
EKG T AXIS: 123 DEGREES
EKG VENTRICULAR RATE: 96 BPM
ERYTHROCYTE [DISTWIDTH] IN BLOOD BY AUTOMATED COUNT: 13.2 % (ref 11.8–14.4)
HCT VFR BLD AUTO: 50.2 % (ref 40.7–50.3)
HGB BLD-MCNC: 16.6 G/DL (ref 13–17)
MCH RBC QN AUTO: 32.1 PG (ref 25.2–33.5)
MCHC RBC AUTO-ENTMCNC: 33.1 G/DL (ref 28.4–34.8)
MCV RBC AUTO: 97.1 FL (ref 82.6–102.9)
NRBC AUTOMATED: 0 PER 100 WBC
PARTIAL THROMBOPLASTIN TIME: 111.4 SEC (ref 23–36.5)
PARTIAL THROMBOPLASTIN TIME: 52.8 SEC (ref 23–36.5)
PLATELET # BLD AUTO: 144 K/UL (ref 138–453)
PMV BLD AUTO: 10.3 FL (ref 8.1–13.5)
RBC # BLD AUTO: 5.17 M/UL (ref 4.21–5.77)
SURGICAL PATHOLOGY REPORT: NORMAL
WBC OTHER # BLD: 9.7 K/UL (ref 3.5–11.3)

## 2023-06-09 PROCEDURE — 36415 COLL VENOUS BLD VENIPUNCTURE: CPT

## 2023-06-09 PROCEDURE — 2580000003 HC RX 258: Performed by: INTERNAL MEDICINE

## 2023-06-09 PROCEDURE — 85027 COMPLETE CBC AUTOMATED: CPT

## 2023-06-09 PROCEDURE — 6370000000 HC RX 637 (ALT 250 FOR IP): Performed by: SURGERY

## 2023-06-09 PROCEDURE — 6370000000 HC RX 637 (ALT 250 FOR IP): Performed by: INTERNAL MEDICINE

## 2023-06-09 PROCEDURE — 2580000003 HC RX 258: Performed by: ANESTHESIOLOGY

## 2023-06-09 PROCEDURE — 85730 THROMBOPLASTIN TIME PARTIAL: CPT

## 2023-06-09 PROCEDURE — 99232 SBSQ HOSP IP/OBS MODERATE 35: CPT | Performed by: SURGERY

## 2023-06-09 PROCEDURE — 99239 HOSP IP/OBS DSCHRG MGMT >30: CPT | Performed by: FAMILY MEDICINE

## 2023-06-09 RX ORDER — ASPIRIN 81 MG/1
81 TABLET, CHEWABLE ORAL DAILY
Qty: 30 TABLET | Refills: 3 | Status: ON HOLD | OUTPATIENT
Start: 2023-06-10 | End: 2023-06-13 | Stop reason: SDUPTHER

## 2023-06-09 RX ORDER — ENOXAPARIN SODIUM 100 MG/ML
1 INJECTION SUBCUTANEOUS 2 TIMES DAILY
Status: DISCONTINUED | OUTPATIENT
Start: 2023-06-09 | End: 2023-06-09 | Stop reason: HOSPADM

## 2023-06-09 RX ORDER — ATORVASTATIN CALCIUM 80 MG/1
80 TABLET, FILM COATED ORAL NIGHTLY
Qty: 30 TABLET | Refills: 3 | Status: SHIPPED | OUTPATIENT
Start: 2023-06-09 | End: 2023-07-03 | Stop reason: SDUPTHER

## 2023-06-09 RX ORDER — AMIODARONE HYDROCHLORIDE 200 MG/1
200 TABLET ORAL 2 TIMES DAILY
Qty: 28 TABLET | Refills: 0 | Status: SHIPPED | OUTPATIENT
Start: 2023-06-09 | End: 2023-06-23

## 2023-06-09 RX ORDER — ENOXAPARIN SODIUM 100 MG/ML
1 INJECTION SUBCUTANEOUS 2 TIMES DAILY
Qty: 2.4 ML | Refills: 0 | Status: SHIPPED | OUTPATIENT
Start: 2023-06-09 | End: 2023-06-09 | Stop reason: SDUPTHER

## 2023-06-09 RX ORDER — ENOXAPARIN SODIUM 100 MG/ML
1 INJECTION SUBCUTANEOUS 2 TIMES DAILY
Qty: 2.4 ML | Refills: 0 | Status: SHIPPED | OUTPATIENT
Start: 2023-06-09 | End: 2023-06-11

## 2023-06-09 RX ORDER — AMIODARONE HYDROCHLORIDE 200 MG/1
200 TABLET ORAL 2 TIMES DAILY
Status: DISCONTINUED | OUTPATIENT
Start: 2023-06-09 | End: 2023-06-09 | Stop reason: HOSPADM

## 2023-06-09 RX ORDER — SPIRONOLACTONE 25 MG/1
25 TABLET ORAL DAILY
Qty: 30 TABLET | Refills: 3 | Status: SHIPPED | OUTPATIENT
Start: 2023-06-10 | End: 2023-07-03 | Stop reason: SDUPTHER

## 2023-06-09 RX ADMIN — METOPROLOL TARTRATE 50 MG: 50 TABLET, FILM COATED ORAL at 08:32

## 2023-06-09 RX ADMIN — PANTOPRAZOLE SODIUM 40 MG: 40 TABLET, DELAYED RELEASE ORAL at 08:32

## 2023-06-09 RX ADMIN — PREDNISONE 40 MG: 20 TABLET ORAL at 08:32

## 2023-06-09 RX ADMIN — SODIUM CHLORIDE, PRESERVATIVE FREE 10 ML: 5 INJECTION INTRAVENOUS at 08:29

## 2023-06-09 RX ADMIN — SPIRONOLACTONE 25 MG: 25 TABLET, FILM COATED ORAL at 08:32

## 2023-06-09 RX ADMIN — SACUBITRIL AND VALSARTAN 1 TABLET: 24; 26 TABLET, FILM COATED ORAL at 08:32

## 2023-06-09 RX ADMIN — ASPIRIN 81 MG 81 MG: 81 TABLET ORAL at 08:32

## 2023-06-09 RX ADMIN — SODIUM CHLORIDE, PRESERVATIVE FREE 10 ML: 5 INJECTION INTRAVENOUS at 08:28

## 2023-06-09 RX ADMIN — AMIODARONE HYDROCHLORIDE 200 MG: 200 TABLET ORAL at 10:56

## 2023-06-09 ASSESSMENT — ENCOUNTER SYMPTOMS
CHEST TIGHTNESS: 0
NAUSEA: 0
SHORTNESS OF BREATH: 1
VOMITING: 0
WHEEZING: 0
CONSTIPATION: 0
COUGH: 0
ABDOMINAL PAIN: 0
BLOOD IN STOOL: 0
DIARRHEA: 0

## 2023-06-09 NOTE — CARE COORDINATION
Life vest ordered received. Spoke to Jose from [place]. He will send in information    1600 met with patient and wife to discuss transitional planning. Patient is returning home.  He denies needs and has transportation    [phone] notified by Jose that life vest has been approved and is scheduled to be fit by 6:30pm. Patient and wife updated    Discharge Report    [facility])  Clinical Case Management Department  Written by: Leora Cisneros RN    Patient Name: Pilo Key  Attending Provider: Torie Biswas MD  Admit Date: 6/3/2023  6:08 PM  MRN: [id number]  Account: [de-identified]                     : 1950  Discharge Date: 2023      Disposition: home    Leora Cisneros RN

## 2023-06-09 NOTE — PROGRESS NOTES
[facility]  Office: [address], DO, Aloma Blood, DO, Mehta Girt, DO, Mau Castillo Blood, DO, Amna Kwan MD, Mirian Ochoa MD, Argenis Betancourt MD, Nik Liz MD,  Manish Swanson MD, Lupe Hurley MD, Shari Nuñez, DO, Mario Romero MD,  Elizabeth Tucson, DO, Shirley MD Azar, Jenniffer Christie MD, Royer Lucas, DO, Aureliano Blake MD, Antoinette Curran MD, Steph Osorio, DO, Melodie Lao MD, Wendy Wing MD, Rosaline Chicas MD, Micky Fuller MD,  Laura Santa, DO, Tonio Alvarado MD,  Johann Juárez, CNP,  Jacquelin Perrin, CNP, Kian Moraes, CNP, Cori Vance, CNP,  Alexandria Turner, [facility], Williams Malloy, CNP, Becky Rendon, CNP, El Randall, CNP, Mira Byrd, CNP, Walter Carvajal, CNP, Anthony Yepez PA-C, Razia Stephen, CNS, Farzana Blanc, CNP, Lauren Walton, [address] [place] [address]    Progress Note    6/9/2023    5:04 PM    Name:   Jase Christian  MRN:     [id number]     Acct:      [de-identified]   Room:   2004/2004-01  IP Day:  6  Admit Date:  6/3/2023  6:08 PM    PCP:   Unknown Provider Result (Inactive)  Code Status:  Full Code    Subjective:     C/C:   Interval History Status: better    Patient seen and examined at bedside, no acute events overnight.     EGD results noted  Very frustrated and wanting to leave to the point that he is threatening to leave AMA  Vital signs stable , wife at bedside  Patient vitals, labs and all providers notes were reviewed,from overnight shift and morning updates were noted and discussed with the nurse    Brief History:     70-year-old male past medical history of orthostatic hypotension, hyperlipidemia presents to [facility] facility with shortness of breath found to have new onset A-fib patient was originally started on amiodarone drip underwent echo showing LVEF 15 to 20% with global hypokinesis of the left ventricle and moderate tricuspid regurgitation moderate to severe

## 2023-06-09 NOTE — PROGRESS NOTES
Comprehensive Nutrition Assessment    Type and Reason for Visit:  Initial, RD Nutrition Re-Screen/LOS (Day 7 length of stay)    Nutrition Recommendations/Plan:   Suggest starting a standard high calorie/high protein oral nutrition supplement once/day due to reported poor intakes and weight loss since admission. Continue to monitor weights, labs, intakes and follow up. Malnutrition Assessment:  Malnutrition Status:  Insufficient data (06/09/23 1024)    Context:  Acute Illness     Findings of the 6 clinical characteristics of malnutrition:  Energy Intake:  Mild decrease in energy intake (Comment)  Weight Loss:  1% to 2% over 1 week     Body Fat Loss:  Unable to assess     Muscle Mass Loss:  Unable to assess    Fluid Accumulation:  Unable to assess     Strength:  Not Performed    Nutrition Assessment:    67 y/o male, admitted realted to acute systolic heart failure, cardiomyopathy, AFib. PMH unknown. Patient seen today for length of stay. Patient currently NPO for testing. Patient is s/p EGD biopsy of esophagus 6/8. RN reported patient had a poor appetite/intakes prior to NPO and that patient is eager to be dischaged. LBM 6/8. +BS. Nutrition Related Findings:    Meds/Labs reviewed Wound Type: None       Current Nutrition Intake & Therapies:    Average Meal Intake: NPO  Average Supplements Intake: NPO  ADULT DIET; Regular; Low Fat/Low Chol/High Fiber/2 gm Na; 1800 ml    Anthropometric Measures:  Height: 5' 11\" (180.3 cm)  Ideal Body Weight (IBW): 172 lbs (78 kg)    Admission Body Weight: 186 lb 12 oz (84.7 kg)  Current Body Weight: 179 lb 0.2 oz (81.2 kg), 104.1 % IBW.  Weight Source: Bed Scale  Current BMI (kg/m2): 25  Weight Adjustment For: No Adjustment  BMI Categories: Normal Weight (BMI 22.0 to 24.9) age over 72    Estimated Daily Nutrient Needs:  Energy Requirements Based On: Kcal/kg  Weight Used for Energy Requirements: Current  Energy (kcal/day): 2000kcal/day  Weight Used for Protein Requirements:

## 2023-06-09 NOTE — PLAN OF CARE
Problem: Discharge Planning  Goal: Discharge to home or other facility with appropriate resources  Outcome: Completed     Problem: Chronic Conditions and Co-morbidities  Goal: Patient's chronic conditions and co-morbidity symptoms are monitored and maintained or improved  Outcome: Completed     Problem: Pain  Goal: Verbalizes/displays adequate comfort level or baseline comfort level  Outcome: Completed     Problem: Skin/Tissue Integrity  Goal: Absence of new skin breakdown  Description: 1. Monitor for areas of redness and/or skin breakdown  2. Assess vascular access sites hourly  3. Every 4-6 hours minimum:  Change oxygen saturation probe site  4. Every 4-6 hours:  If on nasal continuous positive airway pressure, respiratory therapy assess nares and determine need for appliance change or resting period.   Outcome: Completed     Problem: ABCDS Injury Assessment  Goal: Absence of physical injury  Outcome: Completed     Problem: Nutrition Deficit:  Goal: Optimize nutritional status  6/9/2023 1950 by Jasper Arzate RN  Outcome: Completed  6/9/2023 1038 by Markie Han RD  Flowsheets (Taken 6/9/2023 1038)  Nutrient intake appropriate for improving, restoring, or maintaining nutritional needs:   Assess nutritional status and recommend course of action   Recommend appropriate diets, oral nutritional supplements, and vitamin/mineral supplements   Monitor oral intake, labs, and treatment plans     Problem: Cardiovascular - Adult  Goal: Absence of cardiac dysrhythmias or at baseline  Outcome: Completed     Problem: Cardiovascular - Adult  Goal: Maintains optimal cardiac output and hemodynamic stability  Outcome: Completed

## 2023-06-09 NOTE — DISCHARGE SUMMARY
[facility]  Office: [address], DO, Clara Adriana, DO, Bessieizabella Velázquez, DO, Nolan Weirshantal Blood, DO, Malik Rodriguez MD, Dianna Anderson MD, Sonya Cabrera MD, Kya Jane MD,  Ephraim Burt MD, Jennifer Arteaga MD, Jan Solorzano, DO, Shoshana Brizuela MD,  Gregg Louie MD, Margie Malik MD, Dutch Guevara DO, Ashli Lin MD, Valerie Grimaldo MD, Ramiro Moe DO, Jose Angela MD, Rg Robin MD, Cleveland Greenberg MD, Stephan Hayes MD,  Jn Morales DO, Kajal Jones MD,  Carlos Enrique Hanson, [facility],  Sukhjinder Escamilla, CNP, Samira Holguin, CNP, Perley Gilford, CNP,  Fernando Goodwin, [facility], Millie Dhaliwal, CNP, Lashell Zimmer, CNP, Negin Trevino, CNP, Ana Nava, CNP, Lawyer Blanca, CNP, Tatiana Gordon PA-C, German Mayes, CNS, Cletis Hashimoto, CNP, Bárbara Braga, [facility] [address]    Discharge Summary     Patient ID: Nora Browne  :  1950   MRN: [id number]     ACCOUNT:  [de-identified]   Patient's PCP: Unknown Provider Result (Inactive)  Admit Date: 6/3/2023   Discharge Date: 2023     Length of Stay: 6  Code Status:  Full Code  Admitting Physician: Kya Jane MD  Discharge Physician: Kya Jane MD     Active Discharge Diagnoses:     Hospital Problem Lists:  Principal Problem:    Acute systolic heart failure [facility])  Active Problems:    Cardiomyopathy [facility])    Severe mitral regurgitation    Moderate tricuspid regurgitation    Smoker    Subclinical hypothyroidism    New onset atrial fibrillation [facility])  Resolved Problems:    * No resolved hospital problems.  *      Admission Condition:  poor     Discharged Condition: fair    Hospital Stay:     HPI:      77-year-old male past medical history of orthostatic hypotension, hyperlipidemia presents to outlying facility with shortness of breath found to have new onset A-fib patient was originally started on amiodarone drip underwent echo showing MG tablet  enoxaparin 80 MG/0.8ML  metoprolol tartrate 25 MG tablet  sacubitril-valsartan 24-26 MG per tablet  spironolactone 25 MG tablet         No discharge procedures on file. Time Spent on discharge is  35 mins in patient examination, evaluation, counseling as well as medication reconciliation, prescriptions for required medications, discharge plan and follow up. Electronically signed by   Tony Fried MD  6/9/2023  5:12 PM      Thank you Dr. Laverne Londono Provider Result (Inactive) for the opportunity to be involved in this patient's care.

## 2023-06-09 NOTE — FLOWSHEET NOTE
Patient discharged to home, accompanied by wife, with all belongings. Life vest fitted and worn by patient. Instructed on how to administer Lovenox injection, verbalized understanding by demonstration. Discharge instructions given by previous RN. No other complaints made, vital signs remain stable prior to discharge.

## 2023-06-09 NOTE — PLAN OF CARE
Patient is arranged for JEAN PIERRE with anesthesia  / PCI to LAD on Monday at 80 with Dr. Ivone Muller as outpatient if discharged     Electronically signed by DAWSON Birch NP on 6/9/23 at 2:37 PM EDT

## 2023-06-09 NOTE — PLAN OF CARE
Problem: Discharge Planning  Goal: Discharge to home or other facility with appropriate resources  6/9/2023 0500 by Rosangela Brandon RN  Outcome: Progressing     Problem: Chronic Conditions and Co-morbidities  Goal: Patient's chronic conditions and co-morbidity symptoms are monitored and maintained or improved  6/9/2023 0500 by Rosangela Brandon RN  Outcome: Progressing     Problem: ABCDS Injury Assessment  Goal: Absence of physical injury  6/9/2023 0500 by Rosangela Brandon RN  Outcome: Progressing     Problem: Cardiovascular - Adult  Goal: Maintains optimal cardiac output and hemodynamic stability  Outcome: Progressing     Problem: Cardiovascular - Adult  Goal: Absence of cardiac dysrhythmias or at baseline  Outcome: Progressing

## 2023-06-12 PROBLEM — I25.10 CAD S/P PERCUTANEOUS CORONARY ANGIOPLASTY: Status: ACTIVE | Noted: 2023-06-12

## 2023-06-12 PROBLEM — Z98.61 CAD S/P PERCUTANEOUS CORONARY ANGIOPLASTY: Status: ACTIVE | Noted: 2023-06-12

## 2023-06-12 NOTE — PROGRESS NOTES
CLINICAL PHARMACY NOTE: MEDS TO BEDS    Total # of Prescriptions Filled: 7   The following medications were delivered to the patient:  Lovenox   Entresto 24-26   Aldactone 14mg   Amiodarone 200mg   Lipitor 80mg   Metoprolol tart 25mg   Asa 81mg chew     Additional Documentation:   Josie delivered, room 2004, paid with check

## 2023-10-04 PROBLEM — K57.30 DIVERTICULOSIS OF LARGE INTESTINE WITHOUT HEMORRHAGE: Status: ACTIVE | Noted: 2018-03-20

## 2023-10-04 PROBLEM — R42 DIZZINESS: Status: ACTIVE | Noted: 2019-10-29

## 2023-10-11 PROBLEM — I25.10 CORONARY ARTERY DISEASE INVOLVING NATIVE CORONARY ARTERY OF NATIVE HEART WITHOUT ANGINA PECTORIS: Status: ACTIVE | Noted: 2023-10-11

## 2023-10-11 PROBLEM — E78.00 PURE HYPERCHOLESTEROLEMIA: Status: ACTIVE | Noted: 2023-10-11

## 2023-10-11 PROBLEM — Z95.5 S/P CORONARY ARTERY STENT PLACEMENT: Status: ACTIVE | Noted: 2023-10-11

## 2023-10-11 PROBLEM — E66.09 CLASS 1 OBESITY DUE TO EXCESS CALORIES WITH SERIOUS COMORBIDITY IN ADULT: Status: ACTIVE | Noted: 2023-10-11

## 2023-10-11 PROBLEM — I50.23 CHF NYHA CLASS II, ACUTE ON CHRONIC, SYSTOLIC (HCC): Status: ACTIVE | Noted: 2023-10-11

## 2023-10-11 PROBLEM — Z98.890 S/P CARDIAC CATH: Status: ACTIVE | Noted: 2023-10-11

## 2023-10-11 PROBLEM — R94.39 ABNORMAL NUCLEAR STRESS TEST: Status: ACTIVE | Noted: 2023-10-11

## 2023-10-11 PROBLEM — E66.811 CLASS 1 OBESITY DUE TO EXCESS CALORIES WITH SERIOUS COMORBIDITY IN ADULT: Status: ACTIVE | Noted: 2023-10-11

## 2023-10-11 PROBLEM — I48.0 PAROXYSMAL A-FIB (HCC): Status: ACTIVE | Noted: 2023-10-11

## 2023-10-11 PROBLEM — I20.9 TYPICAL ANGINA (HCC): Status: ACTIVE | Noted: 2023-10-11

## 2023-10-11 PROBLEM — I10 HYPERTENSION, ESSENTIAL: Status: ACTIVE | Noted: 2023-10-11

## 2024-11-26 ENCOUNTER — HOSPITAL ENCOUNTER (OUTPATIENT)
Age: 74
Setting detail: OUTPATIENT SURGERY
Discharge: HOME | End: 2024-11-26
Attending: INTERNAL MEDICINE | Admitting: INTERNAL MEDICINE
Payer: MEDICARE

## 2024-11-26 VITALS
HEART RATE: 72 BPM | DIASTOLIC BLOOD PRESSURE: 67 MMHG | HEIGHT: 71 IN | TEMPERATURE: 97.3 F | BODY MASS INDEX: 25.62 KG/M2 | RESPIRATION RATE: 32 BRPM | WEIGHT: 183 LBS | SYSTOLIC BLOOD PRESSURE: 108 MMHG | OXYGEN SATURATION: 99 %

## 2024-11-26 DIAGNOSIS — R94.39 ABNORMAL STRESS TEST: ICD-10-CM

## 2024-11-26 LAB — ECHO BSA: 2.04 M2

## 2024-11-26 PROCEDURE — 2580000003 HC RX 258: Performed by: INTERNAL MEDICINE

## 2024-11-26 PROCEDURE — 2709999900 HC NON-CHARGEABLE SUPPLY: Performed by: INTERNAL MEDICINE

## 2024-11-26 PROCEDURE — 6370000000 HC RX 637 (ALT 250 FOR IP): Performed by: INTERNAL MEDICINE

## 2024-11-26 PROCEDURE — 2500000003 HC RX 250 WO HCPCS: Performed by: INTERNAL MEDICINE

## 2024-11-26 PROCEDURE — 6360000004 HC RX CONTRAST MEDICATION: Performed by: INTERNAL MEDICINE

## 2024-11-26 PROCEDURE — 7100000011 HC PHASE II RECOVERY - ADDTL 15 MIN: Performed by: INTERNAL MEDICINE

## 2024-11-26 PROCEDURE — C1894 INTRO/SHEATH, NON-LASER: HCPCS | Performed by: INTERNAL MEDICINE

## 2024-11-26 PROCEDURE — 7100000010 HC PHASE II RECOVERY - FIRST 15 MIN: Performed by: INTERNAL MEDICINE

## 2024-11-26 PROCEDURE — 6360000002 HC RX W HCPCS: Performed by: INTERNAL MEDICINE

## 2024-11-26 PROCEDURE — C1769 GUIDE WIRE: HCPCS | Performed by: INTERNAL MEDICINE

## 2024-11-26 PROCEDURE — 99152 MOD SED SAME PHYS/QHP 5/>YRS: CPT | Performed by: INTERNAL MEDICINE

## 2024-11-26 PROCEDURE — 93458 L HRT ARTERY/VENTRICLE ANGIO: CPT | Performed by: INTERNAL MEDICINE

## 2024-11-26 PROCEDURE — 93454 CORONARY ARTERY ANGIO S&I: CPT | Performed by: INTERNAL MEDICINE

## 2024-11-26 PROCEDURE — 99153 MOD SED SAME PHYS/QHP EA: CPT | Performed by: INTERNAL MEDICINE

## 2024-11-26 RX ORDER — SODIUM CHLORIDE 0.9 % (FLUSH) 0.9 %
5-40 SYRINGE (ML) INJECTION PRN
Status: CANCELLED | OUTPATIENT
Start: 2024-11-26

## 2024-11-26 RX ORDER — SODIUM CHLORIDE 9 MG/ML
INJECTION, SOLUTION INTRAVENOUS CONTINUOUS PRN
Status: COMPLETED | OUTPATIENT
Start: 2024-11-26 | End: 2024-11-26

## 2024-11-26 RX ORDER — SODIUM CHLORIDE 9 MG/ML
INJECTION, SOLUTION INTRAVENOUS CONTINUOUS
Status: DISCONTINUED | OUTPATIENT
Start: 2024-11-26 | End: 2024-11-26

## 2024-11-26 RX ORDER — MIDAZOLAM 1 MG/ML
INJECTION INTRAMUSCULAR; INTRAVENOUS PRN
Status: DISCONTINUED | OUTPATIENT
Start: 2024-11-26 | End: 2024-11-26 | Stop reason: HOSPADM

## 2024-11-26 RX ORDER — ASPIRIN 325 MG
325 TABLET ORAL ONCE
Status: COMPLETED | OUTPATIENT
Start: 2024-11-26 | End: 2024-11-26

## 2024-11-26 RX ORDER — ACETAMINOPHEN 325 MG/1
650 TABLET ORAL EVERY 4 HOURS PRN
Status: CANCELLED | OUTPATIENT
Start: 2024-11-26

## 2024-11-26 RX ORDER — EPHEDRINE SULFATE 50 MG/ML
INJECTION INTRAVENOUS PRN
Status: DISCONTINUED | OUTPATIENT
Start: 2024-11-26 | End: 2024-11-26 | Stop reason: HOSPADM

## 2024-11-26 RX ORDER — SODIUM CHLORIDE 0.9 % (FLUSH) 0.9 %
5-40 SYRINGE (ML) INJECTION EVERY 12 HOURS SCHEDULED
Status: CANCELLED | OUTPATIENT
Start: 2024-11-26

## 2024-11-26 RX ORDER — IOPAMIDOL 755 MG/ML
INJECTION, SOLUTION INTRAVASCULAR PRN
Status: DISCONTINUED | OUTPATIENT
Start: 2024-11-26 | End: 2024-11-26 | Stop reason: HOSPADM

## 2024-11-26 RX ORDER — SODIUM CHLORIDE 9 MG/ML
INJECTION, SOLUTION INTRAVENOUS PRN
Status: CANCELLED | OUTPATIENT
Start: 2024-11-26

## 2024-11-26 RX ADMIN — ASPIRIN 325 MG: 325 TABLET ORAL at 08:48

## 2024-11-26 RX ADMIN — SODIUM CHLORIDE: 9 INJECTION, SOLUTION INTRAVENOUS at 08:42

## 2024-11-26 NOTE — PROGRESS NOTES
Remaining air removed and VASC BAND taken off at 1430 / site viewed by patient and wife.  Wound care again reviewed. Site clean soft and dry.  Good color warmth and movement.  Dressing applied.  Ambulates in halls / restroom without distress.  Wife assist to dress

## 2024-11-26 NOTE — PROGRESS NOTES
Patient admitted, consent signed and questions answered. Patient ready for procedure. Call light to reach with side rails up 2 of 2. Right wrist and groin hairs clipped with writer and TONY present.  Wife at bedside with patient.  History and physical needed.

## 2024-11-26 NOTE — DISCHARGE INSTRUCTIONS
DISCHARGE INSTRUCTIONS / ARM CARE POST CATHERIZATION    Home Care     Ok to shower in AM. Discontinue pressure dressing and/or armboard in AM.   Do not apply band aids. KEEP SITE CLEAN DRY AND OPEN TO THE AIR.  No powder or lotion.  Do not soak in a pool or tub and do not swim for one week.   If there is any bleeding at the catheter site, apply firm pressure directly over site with your hand until the bleeding stops. If bleeding continues after 3 minutes call 911.   If there is any swelling or firm areas at your puncture site, this could be bleeding under the skin(hematoma), and if you have any concerns seek help immediately.   Drink plenty of fluids after the test. This will flush the x-ray dye from your system.   Return to your normal diet.         NO STRENUOUS LIFTING WITH AFFECTED ARM FOR 3 DAYS ANYTHING HEAVIER THAN 8 TO 10 POUNDS    WATCH FOR SIGNS OF INFECTION /  REDNESS / SWELLING / DRAINAGE / WARMTH / TEMPERATURE GREATER THAN 101    IF AREA BECOMES HARD AND SWOLLEN AND IF YOU ARE AT ALL CONCERNED SEEK HELP IMMEDIATELY    SEEK HELP IMMEDIATELY IF AFFECTED ARM BECOMES COLD / NUMB / SEVERE PAIN / NAILBEDS TURN BLUE     IF ON METFORMIN / GLUCOPHAGE DO NOT RESTART MEDICATION FOR 48 HOURS    CALL 911 if you have symptoms including:   Drooping facial muscles   Changes in vision or speech   Difficulty walking or using your limbs   Change in sensation to affected leg, including numbness, feeling cold, or change in color   Extreme sweating, nausea or vomiting   Dizziness or lightheadedness   Chest pain   Rapid, irregular heartbeat   Palpitations   Cough, shortness of breath, or difficulty breathing   Weakness or fainting   If you think you have an emergency, CALL 911 .      SEDATION / ANALGESIA INFORMATION / HOME GOING ADVICE  You have received the sedation/analgesia medication during your visit    Sedation/analgesia is used during short medical procedures under controlled supervision. The medication will produce  care is a key part of your treatment and safety. Be sure to make and go to all appointments, and call your doctor if you are having problems. It's also a good idea to know your test results and keep a list of the medicines you take.    Where can you learn more?    Go to [email or web address] and sign in to your [employer] account. Enter C643 in the Search Health Information box to learn more about “Learning About Coronary Artery Disease (CAD).”    If you do not have an account, please click on the “Sign Up Now” link.

## 2024-11-26 NOTE — PROGRESS NOTES
All discharge instructions reviewed with wife and patient, questions answered.  Patient discharged per wheelchair with wife and belongings.  Aware to resume eliquis tonight

## 2024-11-26 NOTE — PROGRESS NOTES
2 CC AIR successfully removed from VASC BAND  /  site clean soft and dry.  Wife reviewing discharge instructions.  Patient took light meal in full.

## 2024-11-26 NOTE — H&P
[facility]  Pre- Procedure History and Physical/Update          Patient Name:  Rashad Figueredo  MRN:    [id number]  YOB: 1950  Date of evaluation:  11/26/2024    Procedure:                           Cardiac Cath +/- PCI     Indication for procedure:     Abnormal stress test      HPI:  74-year-old male with previous history of CAD and stenting to LAD and OM 2 has been having shortness of breath and underwent stress test that showed reversible ischemia.  Patient was referred for coronary angiography.  Patient presents today for the scheduled procedure.       Past Medical History:   Diagnosis Date    Aortic insufficiency     CAD (coronary artery disease)     Current smoker     [place] (hard of hearing)     Hyperlipidemia     Mitral stenosis        Past Surgical History:   Procedure Laterality Date    BACK INJECTION      Lower Back injection for bulging disc in lower back years ago    CARDIAC CATHETERIZATION  06/07/2023    RIGHT AND LEFT / UNABLE TO COMPLETE JEAN PIERRE / Severe LAD and OM2 stenosis.    CARDIAC CATHETERIZATION  11/26/2024    CATARACT EXTRACTION Bilateral     CORONARY ANGIOPLASTY WITH STENT PLACEMENT  06/12/2023    PTCA JORGE-OM2  /  PTCA JORGE =LAD    ESOPHAGOGASTRODUODENOSCOPY  06/08/2023    HERNIA REPAIR      Umbilical Hernia Repair over 20 years ago    SHOULDER SURGERY Left     Left Rotator Cuff Surgery around 10 years ago    TRANSESOPHAGEAL ECHOCARDIOGRAM  06/12/2023    UPPER GASTROINTESTINAL ENDOSCOPY N/A 06/08/2023    EGD BIOPSY performed by Aristeo Conteh MD at [facility]        reports that he has been smoking cigars and cigarettes. He started smoking about 58 years ago. He has a 9.3 pack-year smoking history. He has never used smokeless tobacco. He reports current alcohol use. He reports that he does not use drugs.    Prior to Admission medications    Medication Sig Start Date End Date Taking? Authorizing Provider   amiodarone (CORDARONE) 200 MG tablet Take 1 tablet by mouth daily    understanding and gave consent.       Clarence Sousa MD  Fellow, Cardiovascular Diseases    [facility]

## 2024-11-26 NOTE — PROGRESS NOTES
Patient received post cath to room 6. Assessment obtained.  Post cath pathway initiated. Right radial site with VASC BAND intact / 12 ml air. No hematoma noted. Restrictions reviewed with patient and .  Patient without complaints.

## (undated) DEVICE — GUIDEWIRE 35/260/FC/PTFE/3J: Brand: GUIDEWIRE

## (undated) DEVICE — GLIDESHEATH SLENDER STAINLESS STEEL KIT: Brand: GLIDESHEATH SLENDER

## (undated) DEVICE — ANGIOGRAPHIC CATHETER: Brand: EXPO™

## (undated) DEVICE — TRAY SURG CUST CRD CATH TOLEDO

## (undated) DEVICE — FORCEPS BX L240CM WRK CHN 2.8MM STD CAP W/ NDL MIC MESH

## (undated) DEVICE — BAND COMPR L24CM REG CLR PLAS HEMSTAT EXT HK AND LOOP RETEN